# Patient Record
Sex: FEMALE | Race: BLACK OR AFRICAN AMERICAN | NOT HISPANIC OR LATINO | Employment: OTHER | URBAN - METROPOLITAN AREA
[De-identification: names, ages, dates, MRNs, and addresses within clinical notes are randomized per-mention and may not be internally consistent; named-entity substitution may affect disease eponyms.]

---

## 2019-11-21 ENCOUNTER — APPOINTMENT (OUTPATIENT)
Dept: RADIOLOGY | Facility: MEDICAL CENTER | Age: 64
DRG: 093 | End: 2019-11-21
Attending: EMERGENCY MEDICINE
Payer: MEDICAID

## 2019-11-21 ENCOUNTER — HOSPITAL ENCOUNTER (INPATIENT)
Facility: MEDICAL CENTER | Age: 64
LOS: 1 days | DRG: 093 | End: 2019-11-22
Attending: EMERGENCY MEDICINE | Admitting: HOSPITALIST
Payer: MEDICAID

## 2019-11-21 DIAGNOSIS — R42 LIGHTHEADEDNESS: ICD-10-CM

## 2019-11-21 DIAGNOSIS — R27.0 ATAXIA: ICD-10-CM

## 2019-11-21 DIAGNOSIS — R29.898 LEFT LEG WEAKNESS: ICD-10-CM

## 2019-11-21 DIAGNOSIS — S93.402A SPRAIN OF LEFT ANKLE, UNSPECIFIED LIGAMENT, INITIAL ENCOUNTER: ICD-10-CM

## 2019-11-21 PROBLEM — I10 HTN (HYPERTENSION): Status: ACTIVE | Noted: 2019-11-21

## 2019-11-21 PROBLEM — Z72.0 TOBACCO ABUSE: Status: ACTIVE | Noted: 2019-11-21

## 2019-11-21 LAB
ABO + RH BLD: NORMAL
ABO GROUP BLD: NORMAL
ALBUMIN SERPL BCP-MCNC: 3.2 G/DL (ref 3.2–4.9)
ALBUMIN/GLOB SERPL: 0.8 G/DL
ALP SERPL-CCNC: 138 U/L (ref 30–99)
ALT SERPL-CCNC: 87 U/L (ref 2–50)
ANION GAP SERPL CALC-SCNC: 10 MMOL/L (ref 0–11.9)
APPEARANCE UR: CLEAR
APTT PPP: 27.8 SEC (ref 24.7–36)
AST SERPL-CCNC: 94 U/L (ref 12–45)
BASOPHILS # BLD AUTO: 0.7 % (ref 0–1.8)
BASOPHILS # BLD: 0.05 K/UL (ref 0–0.12)
BILIRUB SERPL-MCNC: 0.3 MG/DL (ref 0.1–1.5)
BILIRUB UR QL STRIP.AUTO: NEGATIVE
BLD GP AB SCN SERPL QL: NORMAL
BUN SERPL-MCNC: 14 MG/DL (ref 8–22)
CALCIUM SERPL-MCNC: 8.4 MG/DL (ref 8.5–10.5)
CHLORIDE SERPL-SCNC: 105 MMOL/L (ref 96–112)
CO2 SERPL-SCNC: 20 MMOL/L (ref 20–33)
COLOR UR: YELLOW
COMMENT 1642: NORMAL
CREAT SERPL-MCNC: 0.52 MG/DL (ref 0.5–1.4)
EKG IMPRESSION: NORMAL
EOSINOPHIL # BLD AUTO: 0.15 K/UL (ref 0–0.51)
EOSINOPHIL NFR BLD: 2.1 % (ref 0–6.9)
ERYTHROCYTE [DISTWIDTH] IN BLOOD BY AUTOMATED COUNT: 46.3 FL (ref 35.9–50)
ERYTHROCYTE [DISTWIDTH] IN BLOOD BY AUTOMATED COUNT: 47 FL (ref 35.9–50)
GLOBULIN SER CALC-MCNC: 4 G/DL (ref 1.9–3.5)
GLUCOSE SERPL-MCNC: 142 MG/DL (ref 65–99)
GLUCOSE UR STRIP.AUTO-MCNC: NEGATIVE MG/DL
HCT VFR BLD AUTO: 46.1 % (ref 37–47)
HCT VFR BLD AUTO: 47.3 % (ref 37–47)
HGB BLD-MCNC: 14.3 G/DL (ref 12–16)
HGB BLD-MCNC: 15 G/DL (ref 12–16)
IMM GRANULOCYTES # BLD AUTO: 0.02 K/UL (ref 0–0.11)
IMM GRANULOCYTES NFR BLD AUTO: 0.3 % (ref 0–0.9)
INR PPP: 0.92 (ref 0.87–1.13)
KETONES UR STRIP.AUTO-MCNC: NEGATIVE MG/DL
LEUKOCYTE ESTERASE UR QL STRIP.AUTO: NEGATIVE
LYMPHOCYTES # BLD AUTO: 2.04 K/UL (ref 1–4.8)
LYMPHOCYTES NFR BLD: 27.9 % (ref 22–41)
MCH RBC QN AUTO: 28.4 PG (ref 27–33)
MCH RBC QN AUTO: 28.7 PG (ref 27–33)
MCHC RBC AUTO-ENTMCNC: 31 G/DL (ref 33.6–35)
MCHC RBC AUTO-ENTMCNC: 31.7 G/DL (ref 33.6–35)
MCV RBC AUTO: 90.4 FL (ref 81.4–97.8)
MCV RBC AUTO: 91.7 FL (ref 81.4–97.8)
MICRO URNS: NORMAL
MONOCYTES # BLD AUTO: 0.91 K/UL (ref 0–0.85)
MONOCYTES NFR BLD AUTO: 12.4 % (ref 0–13.4)
MORPHOLOGY BLD-IMP: NORMAL
NEUTROPHILS # BLD AUTO: 4.14 K/UL (ref 2–7.15)
NEUTROPHILS NFR BLD: 56.6 % (ref 44–72)
NITRITE UR QL STRIP.AUTO: NEGATIVE
NRBC # BLD AUTO: 0 K/UL
NRBC BLD-RTO: 0 /100 WBC
PH UR STRIP.AUTO: 6 [PH] (ref 5–8)
PLATELET # BLD AUTO: 210 K/UL (ref 164–446)
PLATELET # BLD AUTO: 233 K/UL (ref 164–446)
PLATELET BLD QL SMEAR: NORMAL
PMV BLD AUTO: 10.5 FL (ref 9–12.9)
PMV BLD AUTO: 10.9 FL (ref 9–12.9)
POTASSIUM SERPL-SCNC: 4 MMOL/L (ref 3.6–5.5)
PROT SERPL-MCNC: 7.2 G/DL (ref 6–8.2)
PROT UR QL STRIP: NEGATIVE MG/DL
PROTHROMBIN TIME: 12.5 SEC (ref 12–14.6)
RBC # BLD AUTO: 5.03 M/UL (ref 4.2–5.4)
RBC # BLD AUTO: 5.23 M/UL (ref 4.2–5.4)
RBC BLD AUTO: NORMAL
RBC UR QL AUTO: NEGATIVE
RH BLD: NORMAL
SODIUM SERPL-SCNC: 135 MMOL/L (ref 135–145)
SP GR UR STRIP.AUTO: 1.02
TROPONIN T SERPL-MCNC: <6 NG/L (ref 6–19)
UROBILINOGEN UR STRIP.AUTO-MCNC: 1 MG/DL
WBC # BLD AUTO: 7.3 K/UL (ref 4.8–10.8)
WBC # BLD AUTO: 8.7 K/UL (ref 4.8–10.8)

## 2019-11-21 PROCEDURE — 84484 ASSAY OF TROPONIN QUANT: CPT

## 2019-11-21 PROCEDURE — A9270 NON-COVERED ITEM OR SERVICE: HCPCS | Performed by: HOSPITALIST

## 2019-11-21 PROCEDURE — 700102 HCHG RX REV CODE 250 W/ 637 OVERRIDE(OP): Performed by: HOSPITALIST

## 2019-11-21 PROCEDURE — 85610 PROTHROMBIN TIME: CPT

## 2019-11-21 PROCEDURE — 99223 1ST HOSP IP/OBS HIGH 75: CPT | Mod: AI,25 | Performed by: HOSPITALIST

## 2019-11-21 PROCEDURE — 70450 CT HEAD/BRAIN W/O DYE: CPT

## 2019-11-21 PROCEDURE — 700117 HCHG RX CONTRAST REV CODE 255: Performed by: EMERGENCY MEDICINE

## 2019-11-21 PROCEDURE — 93005 ELECTROCARDIOGRAM TRACING: CPT | Performed by: EMERGENCY MEDICINE

## 2019-11-21 PROCEDURE — 86900 BLOOD TYPING SEROLOGIC ABO: CPT

## 2019-11-21 PROCEDURE — 73610 X-RAY EXAM OF ANKLE: CPT | Mod: LT

## 2019-11-21 PROCEDURE — 99285 EMERGENCY DEPT VISIT HI MDM: CPT

## 2019-11-21 PROCEDURE — 73590 X-RAY EXAM OF LOWER LEG: CPT | Mod: LT

## 2019-11-21 PROCEDURE — 0042T CT-CEREBRAL PERFUSION ANALYSIS: CPT

## 2019-11-21 PROCEDURE — 85025 COMPLETE CBC W/AUTO DIFF WBC: CPT

## 2019-11-21 PROCEDURE — 71045 X-RAY EXAM CHEST 1 VIEW: CPT

## 2019-11-21 PROCEDURE — 86850 RBC ANTIBODY SCREEN: CPT

## 2019-11-21 PROCEDURE — 99406 BEHAV CHNG SMOKING 3-10 MIN: CPT | Performed by: HOSPITALIST

## 2019-11-21 PROCEDURE — 80053 COMPREHEN METABOLIC PANEL: CPT

## 2019-11-21 PROCEDURE — 85730 THROMBOPLASTIN TIME PARTIAL: CPT

## 2019-11-21 PROCEDURE — 93005 ELECTROCARDIOGRAM TRACING: CPT

## 2019-11-21 PROCEDURE — 770006 HCHG ROOM/CARE - MED/SURG/GYN SEMI*

## 2019-11-21 PROCEDURE — 86901 BLOOD TYPING SEROLOGIC RH(D): CPT

## 2019-11-21 PROCEDURE — 85027 COMPLETE CBC AUTOMATED: CPT

## 2019-11-21 PROCEDURE — 81003 URINALYSIS AUTO W/O SCOPE: CPT

## 2019-11-21 RX ORDER — ASPIRIN 300 MG/1
300 SUPPOSITORY RECTAL DAILY
Status: DISCONTINUED | OUTPATIENT
Start: 2019-11-21 | End: 2019-11-22 | Stop reason: HOSPADM

## 2019-11-21 RX ORDER — ASPIRIN 325 MG
325 TABLET ORAL DAILY
Status: DISCONTINUED | OUTPATIENT
Start: 2019-11-21 | End: 2019-11-22 | Stop reason: HOSPADM

## 2019-11-21 RX ORDER — ASPIRIN 325 MG
325 TABLET ORAL ONCE
Status: DISCONTINUED | OUTPATIENT
Start: 2019-11-21 | End: 2019-11-22 | Stop reason: HOSPADM

## 2019-11-21 RX ORDER — ASPIRIN 81 MG/1
324 TABLET, CHEWABLE ORAL DAILY
Status: DISCONTINUED | OUTPATIENT
Start: 2019-11-21 | End: 2019-11-22 | Stop reason: HOSPADM

## 2019-11-21 RX ORDER — LORATADINE 10 MG/1
10 CAPSULE, LIQUID FILLED ORAL
COMMUNITY
End: 2020-06-08

## 2019-11-21 RX ORDER — POLYETHYLENE GLYCOL 3350 17 G/17G
1 POWDER, FOR SOLUTION ORAL
Status: DISCONTINUED | OUTPATIENT
Start: 2019-11-21 | End: 2019-11-22 | Stop reason: HOSPADM

## 2019-11-21 RX ORDER — ATORVASTATIN CALCIUM 80 MG/1
80 TABLET, FILM COATED ORAL EVERY EVENING
Status: DISCONTINUED | OUTPATIENT
Start: 2019-11-21 | End: 2019-11-22 | Stop reason: HOSPADM

## 2019-11-21 RX ORDER — CHLORAL HYDRATE 500 MG
1000 CAPSULE ORAL
COMMUNITY
End: 2020-06-08

## 2019-11-21 RX ORDER — BISACODYL 10 MG
10 SUPPOSITORY, RECTAL RECTAL
Status: DISCONTINUED | OUTPATIENT
Start: 2019-11-21 | End: 2019-11-22 | Stop reason: HOSPADM

## 2019-11-21 RX ORDER — AMLODIPINE BESYLATE 5 MG/1
10 TABLET ORAL DAILY
Status: DISCONTINUED | OUTPATIENT
Start: 2019-11-22 | End: 2019-11-22 | Stop reason: HOSPADM

## 2019-11-21 RX ORDER — AMOXICILLIN 250 MG
2 CAPSULE ORAL 2 TIMES DAILY
Status: DISCONTINUED | OUTPATIENT
Start: 2019-11-22 | End: 2019-11-22 | Stop reason: HOSPADM

## 2019-11-21 RX ORDER — NICOTINE 21 MG/24HR
14 PATCH, TRANSDERMAL 24 HOURS TRANSDERMAL
Status: DISCONTINUED | OUTPATIENT
Start: 2019-11-22 | End: 2019-11-22 | Stop reason: HOSPADM

## 2019-11-21 RX ORDER — AMLODIPINE BESYLATE 10 MG/1
10 TABLET ORAL DAILY
COMMUNITY
End: 2020-06-08

## 2019-11-21 RX ADMIN — IOHEXOL 40 ML: 350 INJECTION, SOLUTION INTRAVENOUS at 18:18

## 2019-11-21 RX ADMIN — ASPIRIN 325 MG: 325 TABLET, FILM COATED ORAL at 19:56

## 2019-11-21 RX ADMIN — ATORVASTATIN CALCIUM 80 MG: 80 TABLET, FILM COATED ORAL at 23:11

## 2019-11-21 ASSESSMENT — COPD QUESTIONNAIRES
DO YOU EVER COUGH UP ANY MUCUS OR PHLEGM?: NO/ONLY WITH OCCASIONAL COLDS OR INFECTIONS
IN THE PAST 12 MONTHS DO YOU DO LESS THAN YOU USED TO BECAUSE OF YOUR BREATHING PROBLEMS: DISAGREE/UNSURE
HAVE YOU SMOKED AT LEAST 100 CIGARETTES IN YOUR ENTIRE LIFE: YES
DURING THE PAST 4 WEEKS HOW MUCH DID YOU FEEL SHORT OF BREATH: NONE/LITTLE OF THE TIME
COPD SCREENING SCORE: 4

## 2019-11-21 ASSESSMENT — ENCOUNTER SYMPTOMS
HEADACHES: 0
VOMITING: 0
CHILLS: 0
WHEEZING: 0
PHOTOPHOBIA: 0
SHORTNESS OF BREATH: 0
DIARRHEA: 0
COUGH: 0
PALPITATIONS: 0
DEPRESSION: 0
FOCAL WEAKNESS: 0
MYALGIAS: 0
SENSORY CHANGE: 1
NAUSEA: 0
FEVER: 0
SORE THROAT: 0
TINGLING: 0
DIZZINESS: 1
ABDOMINAL PAIN: 0

## 2019-11-21 ASSESSMENT — LIFESTYLE VARIABLES
AVERAGE NUMBER OF DAYS PER WEEK YOU HAVE A DRINK CONTAINING ALCOHOL: 0
HAVE YOU EVER FELT YOU SHOULD CUT DOWN ON YOUR DRINKING: NO
EVER_SMOKED: YES
TOTAL SCORE: 0
ON A TYPICAL DAY WHEN YOU DRINK ALCOHOL HOW MANY DRINKS DO YOU HAVE: 0
EVER FELT BAD OR GUILTY ABOUT YOUR DRINKING: NO
EVER HAD A DRINK FIRST THING IN THE MORNING TO STEADY YOUR NERVES TO GET RID OF A HANGOVER: NO
HOW MANY TIMES IN THE PAST YEAR HAVE YOU HAD 5 OR MORE DRINKS IN A DAY: 0
ALCOHOL_USE: NO
CONSUMPTION TOTAL: NEGATIVE
HAVE PEOPLE ANNOYED YOU BY CRITICIZING YOUR DRINKING: NO
TOTAL SCORE: 0
TOTAL SCORE: 0
EVER_SMOKED: YES

## 2019-11-21 ASSESSMENT — PATIENT HEALTH QUESTIONNAIRE - PHQ9
2. FEELING DOWN, DEPRESSED, IRRITABLE, OR HOPELESS: NOT AT ALL
SUM OF ALL RESPONSES TO PHQ9 QUESTIONS 1 AND 2: 0
1. LITTLE INTEREST OR PLEASURE IN DOING THINGS: NOT AT ALL

## 2019-11-21 ASSESSMENT — PAIN DESCRIPTION - DESCRIPTORS: DESCRIPTORS: SORE

## 2019-11-21 NOTE — ED TRIAGE NOTES
Pt taken for EKG, then returned to lobby, educated on triage process, and to inform staff of any changes or concerns.

## 2019-11-21 NOTE — ED TRIAGE NOTES
"Lizzy Walton  Chief Complaint   Patient presents with   • Leg Pain     left lower, started today   • Dizziness     intermittent over last few days   • Sent from Urgent Care     Pt ambulatory to triage with above complaint.  VSS, no acute distress. Pt concerned because hx of stroke,  Previous sx included \"swimming in the head\".    Pt also states that she fell 2 days ago on left leg, but did not have pain until today.   Pt returned to lobby, educated on triage process, and to inform staff of any changes or concerns.     "

## 2019-11-21 NOTE — ED NOTES
Patient is resting comfortably.watching television at this time, call light device in hand and patient has received instruction on use and verbalized understanding

## 2019-11-22 ENCOUNTER — APPOINTMENT (OUTPATIENT)
Dept: RADIOLOGY | Facility: MEDICAL CENTER | Age: 64
DRG: 093 | End: 2019-11-22
Attending: HOSPITALIST
Payer: MEDICAID

## 2019-11-22 ENCOUNTER — APPOINTMENT (OUTPATIENT)
Dept: CARDIOLOGY | Facility: MEDICAL CENTER | Age: 64
DRG: 093 | End: 2019-11-22
Attending: HOSPITALIST
Payer: MEDICAID

## 2019-11-22 VITALS
HEIGHT: 63 IN | OXYGEN SATURATION: 96 % | BODY MASS INDEX: 32.73 KG/M2 | SYSTOLIC BLOOD PRESSURE: 132 MMHG | HEART RATE: 90 BPM | RESPIRATION RATE: 16 BRPM | TEMPERATURE: 99 F | WEIGHT: 184.75 LBS | DIASTOLIC BLOOD PRESSURE: 64 MMHG

## 2019-11-22 PROBLEM — J40 BRONCHITIS: Status: ACTIVE | Noted: 2019-11-22

## 2019-11-22 LAB
ANION GAP SERPL CALC-SCNC: 7 MMOL/L (ref 0–11.9)
BUN SERPL-MCNC: 14 MG/DL (ref 8–22)
CALCIUM SERPL-MCNC: 8.8 MG/DL (ref 8.5–10.5)
CHLORIDE SERPL-SCNC: 106 MMOL/L (ref 96–112)
CHOLEST SERPL-MCNC: 147 MG/DL (ref 100–199)
CO2 SERPL-SCNC: 25 MMOL/L (ref 20–33)
CREAT SERPL-MCNC: 0.72 MG/DL (ref 0.5–1.4)
ERYTHROCYTE [DISTWIDTH] IN BLOOD BY AUTOMATED COUNT: 45.1 FL (ref 35.9–50)
EST. AVERAGE GLUCOSE BLD GHB EST-MCNC: 134 MG/DL
GLUCOSE SERPL-MCNC: 114 MG/DL (ref 65–99)
HBA1C MFR BLD: 6.3 % (ref 0–5.6)
HCT VFR BLD AUTO: 46.8 % (ref 37–47)
HDLC SERPL-MCNC: 35 MG/DL
HGB BLD-MCNC: 15 G/DL (ref 12–16)
LDLC SERPL CALC-MCNC: 92 MG/DL
MCH RBC QN AUTO: 28.6 PG (ref 27–33)
MCHC RBC AUTO-ENTMCNC: 32.1 G/DL (ref 33.6–35)
MCV RBC AUTO: 89.3 FL (ref 81.4–97.8)
PLATELET # BLD AUTO: 222 K/UL (ref 164–446)
PMV BLD AUTO: 10.7 FL (ref 9–12.9)
POTASSIUM SERPL-SCNC: 4.1 MMOL/L (ref 3.6–5.5)
RBC # BLD AUTO: 5.24 M/UL (ref 4.2–5.4)
SODIUM SERPL-SCNC: 138 MMOL/L (ref 135–145)
TRIGL SERPL-MCNC: 99 MG/DL (ref 0–149)
WBC # BLD AUTO: 6.4 K/UL (ref 4.8–10.8)

## 2019-11-22 PROCEDURE — 80048 BASIC METABOLIC PNL TOTAL CA: CPT

## 2019-11-22 PROCEDURE — 80061 LIPID PANEL: CPT

## 2019-11-22 PROCEDURE — A9270 NON-COVERED ITEM OR SERVICE: HCPCS | Performed by: HOSPITALIST

## 2019-11-22 PROCEDURE — 93880 EXTRACRANIAL BILAT STUDY: CPT

## 2019-11-22 PROCEDURE — 85027 COMPLETE CBC AUTOMATED: CPT

## 2019-11-22 PROCEDURE — 83036 HEMOGLOBIN GLYCOSYLATED A1C: CPT

## 2019-11-22 PROCEDURE — 97165 OT EVAL LOW COMPLEX 30 MIN: CPT

## 2019-11-22 PROCEDURE — 90686 IIV4 VACC NO PRSV 0.5 ML IM: CPT | Performed by: HOSPITALIST

## 2019-11-22 PROCEDURE — 3E02340 INTRODUCTION OF INFLUENZA VACCINE INTO MUSCLE, PERCUTANEOUS APPROACH: ICD-10-PCS | Performed by: HOSPITALIST

## 2019-11-22 PROCEDURE — 97161 PT EVAL LOW COMPLEX 20 MIN: CPT

## 2019-11-22 PROCEDURE — 700102 HCHG RX REV CODE 250 W/ 637 OVERRIDE(OP): Performed by: HOSPITALIST

## 2019-11-22 PROCEDURE — 70551 MRI BRAIN STEM W/O DYE: CPT

## 2019-11-22 PROCEDURE — 90471 IMMUNIZATION ADMIN: CPT

## 2019-11-22 PROCEDURE — 700111 HCHG RX REV CODE 636 W/ 250 OVERRIDE (IP): Performed by: HOSPITALIST

## 2019-11-22 PROCEDURE — 36415 COLL VENOUS BLD VENIPUNCTURE: CPT

## 2019-11-22 RX ORDER — AZITHROMYCIN 250 MG/1
TABLET, FILM COATED ORAL
Qty: 6 TAB | Refills: 0 | Status: SHIPPED
Start: 2019-11-22 | End: 2020-06-08

## 2019-11-22 RX ADMIN — INFLUENZA A VIRUS A/BRISBANE/02/2018 IVR-190 (H1N1) ANTIGEN (FORMALDEHYDE INACTIVATED), INFLUENZA A VIRUS A/KANSAS/14/2017 X-327 (H3N2) ANTIGEN (FORMALDEHYDE INACTIVATED), INFLUENZA B VIRUS B/PHUKET/3073/2013 ANTIGEN (FORMALDEHYDE INACTIVATED), AND INFLUENZA B VIRUS B/MARYLAND/15/2016 BX-69A ANTIGEN (FORMALDEHYDE INACTIVATED) 0.5 ML: 15; 15; 15; 15 INJECTION, SUSPENSION INTRAMUSCULAR at 14:22

## 2019-11-22 RX ADMIN — AMLODIPINE BESYLATE 10 MG: 5 TABLET ORAL at 04:23

## 2019-11-22 RX ADMIN — NICOTINE 14 MG: 14 PATCH TRANSDERMAL at 04:23

## 2019-11-22 ASSESSMENT — COGNITIVE AND FUNCTIONAL STATUS - GENERAL
SUGGESTED CMS G CODE MODIFIER DAILY ACTIVITY: CI
CLIMB 3 TO 5 STEPS WITH RAILING: A LOT
DAILY ACTIVITIY SCORE: 23
TURNING FROM BACK TO SIDE WHILE IN FLAT BAD: A LITTLE
SUGGESTED CMS G CODE MODIFIER MOBILITY: CK
STANDING UP FROM CHAIR USING ARMS: A LITTLE
WALKING IN HOSPITAL ROOM: A LITTLE
MOBILITY SCORE: 17
MOVING FROM LYING ON BACK TO SITTING ON SIDE OF FLAT BED: A LITTLE
MOVING TO AND FROM BED TO CHAIR: A LITTLE
HELP NEEDED FOR BATHING: A LITTLE

## 2019-11-22 ASSESSMENT — GAIT ASSESSMENTS
GAIT LEVEL OF ASSIST: SUPERVISED
DISTANCE (FEET): 250

## 2019-11-22 ASSESSMENT — ACTIVITIES OF DAILY LIVING (ADL): TOILETING: INDEPENDENT

## 2019-11-22 NOTE — ED NOTES
Called CDU regarding ETA of transport, confirmed patient has an order for cardiac monitoring. Per CDU, RN will be down soon to  patient

## 2019-11-22 NOTE — THERAPY
"Occupational Therapy Evaluation completed.   Plan of Care: Patient with no further skilled OT needs in the acute care setting at this time  Discharge Recommendations:  Equipment: defer boot and mobility AE to PT. Post-acute therapy OT evaluation completed. Patient is currently not being actively followed for occupational therapy services at this time. However, pt may be seen at the request of attending provider for an additional visit to address any discharge or equipment needs within 30 days from evaluation.    Patient at / near baseline. Patient and nurse report doctor considering boot based on PT eval. Defer boot and mobility AE to PT.  DC OT.    See \"Rehab Therapy-Acute\" Patient Summary Report for complete documentation.    "

## 2019-11-22 NOTE — DISCHARGE PLANNING
Patient is eligible for Medicaid Meds to Beds at discharge if they have coverage with [place] Medicaid, Medicaid FFS, Medicaid HMO ([facility]), or [employer]. This service is provided through the [facility] if orders are received by the pharmacy prior to 4pm Monday through Friday excluding holidays. Preferred pharmacy has been changed to [facility]. Please call [phone] prior to discharge.

## 2019-11-22 NOTE — DISCHARGE PLANNING
Medication reconcilliation completed. Medications delivered to patient at bedside. Patient counseled.    Interventions include: insurance doesn't cover ASA 81mg tabs. Patient aware to  at outpatient pharmacy and take 1 tablet daily.     Lizzy Walton   Home Medication Instructions MIRA:[id number]    Printed on:11/22/19 0558   Medication Information                      azithromycin (ZITHROMAX) 250 MG Tab  2 tabs PO on day ONE and then one tab po daily for next 5 days

## 2019-11-22 NOTE — ASSESSMENT & PLAN NOTE
This patient continues to smoke cigarettes. 4 minutes were spent counseling the patient in cessation techniques. Patient understands smoking increases risk factors for stroke and death. Patient is open to counseling.  The benefits of stopping were presented and nicotine replacement has been ordered to assist with withdrawal from nicotine during hospitalization and we will continue to encourage cessation and discuss other supportive resources including community groups and prescription medications.

## 2019-11-22 NOTE — ED NOTES
Report received, assumed care of patient. Patient resting in gurney, eating boxed meal. No needs at this time, will continue to monitor.

## 2019-11-22 NOTE — CARE PLAN
Problem: Safety  Goal: Will remain free from falls  Outcome: PROGRESSING AS EXPECTED  Intervention: Implement fall precautions  Flowsheets  Taken 11/22/2019 0900  Bed Alarm: Yes - Alarm On  Taken 11/22/2019 0800  Environmental Precautions: Treaded Slipper Socks on Patient;Personal Belongings, Wastebasket, Call Bell etc. in Easy Reach;Transferred to Stronger Side;Report Given to Other Health Care Providers Regarding Fall Risk;Bed in Low Position;Communication Sign for Patients & Families;Mobility Assessed & Appropriate Sign Placed     Problem: Infection  Goal: Will remain free from infection  Outcome: PROGRESSING AS EXPECTED  Intervention: Implement standard precautions and perform hand washing before and after patient contact  Note:   Standard precautions implemented.

## 2019-11-22 NOTE — PROGRESS NOTES
Transfered pt. from red pod. Patient alert oriented x4, self. Room air. Assessment complete. Answered all questions and concern regarding care. Safety precaution & hourly rounding in place.

## 2019-11-22 NOTE — THERAPY
"Physical Therapy Evaluation completed.   Bed Mobility:  Supine to Sit: Supervised  Transfers: Sit to Stand: Supervised  Gait: Level Of Assist: Supervised with No Equipment Needed x250ft      Plan of Care: Patient with no further skilled PT needs in the acute care setting at this time  Discharge Recommendations: Equipment: No Equipment Needed. Post-acute therapy Currently anticipate no further skilled therapy needs once patient is discharged from the inpatient setting.    Pt is 63 y/o F admitted 2/2 LLE pain and dizziness. Pt performing all mobility at SPV level. BLE strength equal 4/5. She reports intermittent weakness on LLE and pain with increased mobility. PT educated pt to elevate L ankle when it gets swollen and if she feels that it is unstable she can use an ACE wrap to provide more support. Pt states that she has done this in the past. Pt tolerating amb x250ft with SPV no AD. Pt demonstrates wise based gait using UE and trunk rotation for forward momentum with very fast tan. Utilizing SPC would increase fall risk with these gait mechanics and pt verbalizes understanding. Anticipate DC home with no further PT needs.     See \"Rehab Therapy-Acute\" Patient Summary Report for complete documentation.     "

## 2019-11-22 NOTE — ED NOTES
Attempted pressure flush on r ac piv. Pt unable to tolerated. Ct delayed until better piv is able to be placed.

## 2019-11-22 NOTE — H&P
Hospital Medicine History & Physical Note    Date of Service  11/21/2019    Primary Care Physician  Pcp Pt States None    Consultants  Dr. Weeks, neurology    Code Status  Full    Chief Complaint  Chief Complaint   Patient presents with   • Leg Pain     left lower, started today   • Dizziness     intermittent over last few days   • Sent from Urgent Care       History of Presenting Illness  64 y.o. female who presented on 11/21/2019 after being seen at the urgent care with complaints of dizziness and left leg weakness.  This is a older female with a history of hypertension and reported previous CVA 2 years ago who comes in with complaints as noted above.  The patient states that her symptoms began 3 days ago when she noted that her left leg was weaker than usual as she was walking and found that she was dragging her foot.  Her gait has been unstable because of this which led to a fall and she now also complains of a left ankle pain.  The patient states that she did not seek immediate medical assistance at that time and that the symptoms are different from her prior CVA.  During her prior CVA, she reports mental confusion and difficulty with words none of which have occurred.  She denies any residual deficits from her previous CVA.  Otherwise, the patient does also reported left lower extremity pain which is aggravated when she attempts to ambulate and has no alleviating factors.  She has not had any fevers, chills, headache, chest pain, shortness of breath, abdominal pain, diarrhea or dysuria.    Review of Systems  Review of Systems   Constitutional: Negative for chills and fever.   HENT: Negative for congestion and sore throat.    Eyes: Negative for photophobia.   Respiratory: Negative for cough, shortness of breath and wheezing.    Cardiovascular: Negative for chest pain and palpitations.   Gastrointestinal: Negative for abdominal pain, diarrhea, nausea and vomiting.   Genitourinary: Negative for dysuria.    Musculoskeletal: Negative for myalgias.   Skin: Negative.    Neurological: Positive for dizziness and sensory change. Negative for tingling, focal weakness and headaches.        Unstable walking, left leg weak   Psychiatric/Behavioral: Negative for depression and suicidal ideas.       Past Medical History  Past Medical History:   Diagnosis Date   • Hypertension    • Stroke (HCC)        Surgical History  Patient denies    Family History  History reviewed. No pertinent family history.    Social History  Social History     Tobacco Use   • Smoking status: Current Every Day Smoker     Packs/day: 0.00     Types: Cigarettes   • Smokeless tobacco: Never Used   Substance Use Topics   • Alcohol use: Not Currently   • Drug use: Not Currently       Allergies  No Known Allergies    Medications  No current facility-administered medications on file prior to encounter.      Current Outpatient Medications on File Prior to Encounter   Medication Sig Dispense Refill   • amLODIPine (NORVASC) 10 MG Tab Take 10 mg by mouth every day.     • Loratadine (CLARITIN) 10 MG Cap Take 10 mg by mouth 1 time daily as needed.     • Omega-3 Fatty Acids (FISH OIL) 1000 MG Cap capsule Take 1,000 mg by mouth 1 time daily as needed.         Physical Exam  Hemodynamics  Temp (24hrs), Av.4 °C (97.5 °F), Min:36.4 °C (97.5 °F), Max:36.4 °C (97.5 °F)   Temperature: 36.4 °C (97.5 °F)  Pulse  Av.6  Min: 82  Max: 90    Blood Pressure: 156/82      Respiratory      Respiration: 14, Pulse Oximetry: 94 %             Physical Exam   Constitutional: She is oriented to person, place, and time. No distress.   HENT:   Head: Normocephalic and atraumatic.   Right Ear: External ear normal.   Left Ear: External ear normal.   Eyes: EOM are normal. Right eye exhibits no discharge. Left eye exhibits no discharge.   Neck: Neck supple. No JVD present.   Cardiovascular: Normal rate, regular rhythm and normal heart sounds.   Pulmonary/Chest: Effort normal and breath  sounds normal. No respiratory distress. She exhibits no tenderness.   Abdominal: Soft. Bowel sounds are normal. She exhibits no distension. There is no tenderness.   Musculoskeletal: Normal range of motion.         General: No edema.   Neurological: She is alert and oriented to person, place, and time. No cranial nerve deficit. Coordination abnormal.   Skin: Skin is warm and dry. She is not diaphoretic. No erythema.   Psychiatric: She has a normal mood and affect. Her behavior is normal.   Nursing note and vitals reviewed.    Capillary refill less than 3 seconds, distal pulses intact    Laboratory:  Recent Labs     11/21/19  1600 11/21/19  1730   WBC 7.3 8.7   RBC 5.23 5.03   HEMOGLOBIN 15.0 14.3   HEMATOCRIT 47.3* 46.1   MCV 90.4 91.7   MCH 28.7 28.4   MCHC 31.7* 31.0*   RDW 46.3 47.0   PLATELETCT 210 233   MPV 10.9 10.5     Recent Labs     11/21/19  1600   SODIUM 135   POTASSIUM 4.0   CHLORIDE 105   CO2 20   GLUCOSE 142*   BUN 14   CREATININE 0.52   CALCIUM 8.4*     Recent Labs     11/21/19  1600   ALTSGPT 87*   ASTSGOT 94*   ALKPHOSPHAT 138*   TBILIRUBIN 0.3   GLUCOSE 142*     Recent Labs     11/21/19  1600   APTT 27.8   INR 0.92             No results found for: TROPONINI    Imaging  Ct-head W/o    Result Date: 11/21/2019 11/21/2019 5:55 PM HISTORY/REASON FOR EXAM:  Dizziness TECHNIQUE/EXAM DESCRIPTION AND NUMBER OF VIEWS:    CT of the head without contrast. Contiguous 5 mm axial sections were obtained from the skull base through the vertex. Up to date radiation dose reduction adjustments have been utilized to meet ALARA standards for radiation dose reduction. COMPARISON:   None. FINDINGS: No acute intracranial hemorrhage is seen. There is no midline shift. Ventricles are normal in size and configuration. Gray white junction differentiation is distinct. White matter hypodensities are considerable and there is some encephalomalacia adjacent to the right frontal horn. There is a chronic lacunar infarction of the  right caudate head. Basal ganglia calcifications are likely age-related Visualized paranasal sinuses and mastoid air cells are clear. Leftward nasal septal deviation. Ethmoid osteomas. No acute calvarium abnormality is noted.     No acute intracranial hemorrhage is identified. Extensive white matter hypodensity is present.  This is a nonspecific finding which usually is found to represent chronic microvascular disease in patient's of this demographic.  Demyelination, age indeterminant ischemia and gliosis are also common possibilities. Chronic right frontal and caudate head encephalomalacia/lacunar infarctions    Dx-chest-portable (1 View)    Result Date: 11/21/2019 11/21/2019 3:21 PM HISTORY/REASON FOR EXAM:  Stroke Protocol; Stroke Protocol. TECHNIQUE/EXAM DESCRIPTION AND NUMBER OF VIEWS: Single portable view of the chest. COMPARISON: None FINDINGS: The soft tissues and bony structures are unremarkable. The heart and mediastinal structures are within normal limits. Pulmonary vascularity is normal. The lung fields are clear. There is no effusion or pneumothorax.     No acute cardiopulmonary disease evident.    Ct-cerebral Perfusion Analysis    Result Date: 11/21/2019 11/21/2019 5:55 PM HISTORY/REASON FOR EXAM:  Neuro deficit, acute, persistent or progressing; Stroke protocol. TECHNIQUE/EXAM DESCRIPTION AND NUMBER OF VIEWS: CT Cerebral Perfusion Analysis. The study was performed on a 128 slice [facility] Multidetector CT scanner. Perfusion data and corresponding time-activity curves are processed and displayed as color-coded maps in the axial plane for Cerebral Blood Flow (CBF), Cerebral Blood Volume  (CBV),T Max and Mean Transit Time (MTT) and are post processed on the Ischemia view-RAPID virtual . 40 mL of Omnipaque 350 nonionic contrast was injected intravenously. Low dose optimization technique was utilized for this CT exam including automated exposure control and adjustment of the mA and/or kV  according to patient size. COMPARISON:  None. FINDINGS: Cerebral blood flow less than 30% = 0 mL. T Max more than 6 seconds = 13 mL. Mismatch volume = 13 mL. Mismatch ratio = Infinite     1.  Cerebral blood flow less than 30% likely representing completed infarct = 0 mL. 2.  T Max more than 6 seconds, which may be artifactual or represent a combination of completed infarct and ischemia = 13 mL. 3.  Mismatched volume, which may be artifactual or represent ischemic brain/penumbra = 13 mL. 4.  Please note that the cerebral perfusion was performed on the limited brain tissue around the basal ganglia region. Infarct/ischemia outside the CT perfusion sections can be missed in this study.        Assessment/Plan:  Anticipate that patient will need greater than 2 midnights for management of the discussed medical issues.    * Ataxia  Assessment & Plan  Patient reports previous history of CVA, no residual deficits and symptoms not similar.  Prior she had issues with memory and speech.  Today she has had 3 days of left lower extremity weakness with ataxic gait.  CT scan of the head is negative and the patient is far outside of the window for TPA.  CT perfusion study does show a mismatch but this could be attributed to her previous CVA.  The patient will be admitted to the hospital and monitor closely with every 4 hour neurology checks.  I will start her on an aspirin and a statin and we will plan for an MRI, echocardiogram, and carotid ultrasound in the morning.  Dr. Weeks of neurology was consulted by the emergency room physician and I appreciate his recommendations.    HTN (hypertension)  Assessment & Plan  No need for permissive hypertension, continue home Norvasc.    Tobacco abuse  Assessment & Plan  This patient continues to smoke cigarettes. 4 minutes were spent counseling the patient in cessation techniques. Patient understands smoking increases risk factors for stroke and death. Patient is open to counseling.  The  benefits of stopping were presented and nicotine replacement has been ordered to assist with withdrawal from nicotine during hospitalization and we will continue to encourage cessation and discuss other supportive resources including community groups and prescription medications.      Prophylaxis: Sequential compression devices for DVT prophylaxis, no PPI indicated, bowel protocol as needed

## 2019-11-22 NOTE — PROGRESS NOTES
Spiritual Care Note    Patient Information     Patient's Name: Lizzy Walton   MRN: [id number]    YOB: 1955   Age and Gender: 64 y.o. female   Service Area: [facility]   Room (and Bed): [place] [address]   Ethnicity or Nationality:     Primary Language: English   Caodaism/Spiritual preference: Nondenominational   Place of Residence: [place], [place]   Family/Friends/Others Present: n/a   Clinical Team Present: n/a   Medical Diagnosis(-es)/Procedure(s): Leg pain   Code Status: Full Code    Date of Admission: 11/21/2019   Length of Stay: 1 days        Spiritual Care Provider Information:  Name of Spiritual Care Provider: Yoana Martines  Title of Spiritual Care Provider: Associate   Phone Number: [phone]  E-mail: [email or web address]  5 minutes    Spiritual Screen Results:    Gen Nursing  Spiritual Screen  Is your spiritual health or inner well-being important to you as you cope with your medical condition?: Yes  Would you like to receive a visit from our Spiritual Care team or your own Gnosticism or spiritual leader?: Yes  Was spiritual care education provided to the patient?: Yes     Palliative Care  PC Caodaism/Spiritual Screening  Was spiritual care education provided to the patient?: Yes      Encounter/Request Information  Encounter/Request Type   Referral From/ Origin of Request: [facility] nursing  Referral To: Community clergy    Religous Needs/Values       Spiritual Assessment     Spiritual Care Encounters    Interaction/Conversation: Referred pt to [facility] negron.    Notes:

## 2019-11-22 NOTE — PROGRESS NOTES
IV infiltrate warm compress applied patient tolerating treatment well.  Informed Dayton Begum RN via verbal.

## 2019-11-22 NOTE — PROGRESS NOTES
2 RN skin check completed with TOOTIE Cooper    -Blistering to left forearm due to previous IV infiltrate   -Bilateral heels dry and calloused   -Small scab noted to left knee

## 2019-11-22 NOTE — ED NOTES
Pt back from ct scan. Per report piv infiltrated and approx 80 cc of contrast infiltrated to arm.

## 2019-11-22 NOTE — ASSESSMENT & PLAN NOTE
Patient reports previous history of CVA, no residual deficits and symptoms not similar.  Prior she had issues with memory and speech.  Today she has had 3 days of left lower extremity weakness with ataxic gait.  CT scan of the head is negative and the patient is far outside of the window for TPA.  CT perfusion study does show a mismatch but this could be attributed to her previous CVA.  The patient will be admitted to the hospital and monitor closely with every 4 hour neurology checks.  I will start her on an aspirin and a statin and we will plan for an MRI, echocardiogram, and carotid ultrasound in the morning.  Dr. Weeks of neurology was consulted by the emergency room physician and I appreciate his recommendations.

## 2019-11-23 PROBLEM — Z86.73 HISTORY OF CVA IN ADULTHOOD: Status: ACTIVE | Noted: 2019-11-23

## 2019-11-23 PROCEDURE — 99239 HOSP IP/OBS DSCHRG MGMT >30: CPT | Performed by: HOSPITALIST

## 2019-11-23 NOTE — DISCHARGE INSTRUCTIONS
Discharge Instructions per Eliecer Hubbard M.D.    Do not smoke    DIET: cardiac    ACTIVITY: [id number]    DIAGNOSIS: unsteady gait                Discharge Instructions    Discharged to other by car with escort. Discharged via wheelchair, hospital escort: Refused.  Special equipment needed: Not Applicable    Be sure to schedule a follow-up appointment with your primary care doctor or any specialists as instructed.     Discharge Plan:   Diet Plan: Discussed  Activity Level: Discussed  Confirmed Follow up Appointment: Patient to Call and Schedule Appointment  Confirmed Symptoms Management: Discussed  Medication Reconciliation Updated: Yes  Influenza Vaccine Indication: Indicated: 9 to 64 years of age    I understand that a diet low in cholesterol, fat, and sodium is recommended for good health. Unless I have been given specific instructions below for another diet, I accept this instruction as my diet prescription.   Other diet: Regular    Special Instructions: None    · Is patient discharged on Warfarin / Coumadin?   No     Depression / Suicide Risk    As you are discharged from this [facility] facility, it is important to learn how to keep safe from harming yourself.    Recognize the warning signs:  · Abrupt changes in personality, positive or negative- including increase in energy   · Giving away possessions  · Change in eating patterns- significant weight changes-  positive or negative  · Change in sleeping patterns- unable to sleep or sleeping all the time   · Unwillingness or inability to communicate  · Depression  · Unusual sadness, discouragement and loneliness  · Talk of wanting to die  · Neglect of personal appearance   · Rebelliousness- reckless behavior  · Withdrawal from people/activities they love  · Confusion- inability to concentrate     If you or a loved one observes any of these behaviors or has concerns about self-harm, here's what you can do:  · Talk about it- your feelings and  reasons for harming yourself  · Remove any means that you might use to hurt yourself (examples: pills, rope, extension cords, firearm)  · Get professional help from the community (Mental Health, Substance Abuse, psychological counseling)  · Do not be alone:Call your Safe Contact- someone whom you trust who will be there for you.  · Call your local CRISIS HOTLINE 679-5460 or 260-587-9773  · Call your local Children's Mobile Crisis Response Team [place] (818) 697-6010 or wwwInterse  · Call the toll free National Suicide Prevention Hotlines   · National Suicide Prevention Lifeline 221-261-VZNY (0732)  · National Hope Line Network 800-SUICIDE (112-0556)  Azithromycin tablets  What is this medicine?  AZITHROMYCIN (az ith georgiana MYE sin) is a macrolide antibiotic. It is used to treat or prevent certain kinds of bacterial infections. It will not work for colds, flu, or other viral infections.  This medicine may be used for other purposes; ask your health care provider or pharmacist if you have questions.  COMMON BRAND NAME(S): Zithromax, Zithromax Tri-Kashif, Zithromax Z-Kashif  What should I tell my health care provider before I take this medicine?  They need to know if you have any of these conditions:  -kidney disease  -liver disease  -irregular heartbeat or heart disease  -an unusual or allergic reaction to azithromycin, erythromycin, other macrolide antibiotics, foods, dyes, or preservatives  -pregnant or trying to get pregnant  -breast-feeding  How should I use this medicine?  Take this medicine by mouth with a full glass of water. Follow the directions on the prescription label. The tablets can be taken with food or on an empty stomach. If the medicine upsets your stomach, take it with food. Take your medicine at regular intervals. Do not take your medicine more often than directed. Take all of your medicine as directed even if you think your are better. Do not skip doses or stop your medicine early.  Talk to  your pediatrician regarding the use of this medicine in children. While this drug may be prescribed for children as young as 6 months for selected conditions, precautions do apply.  Overdosage: If you think you have taken too much of this medicine contact a poison control center or emergency room at once.  NOTE: This medicine is only for you. Do not share this medicine with others.  What if I miss a dose?  If you miss a dose, take it as soon as you can. If it is almost time for your next dose, take only that dose. Do not take double or extra doses.  What may interact with this medicine?  Do not take this medicine with any of the following medications:  -lincomycin  This medicine may also interact with the following medications:  -amiodarone  -antacids  -birth control pills  -cyclosporine  -digoxin  -magnesium  -nelfinavir  -phenytoin  -warfarin  This list may not describe all possible interactions. Give your health care provider a list of all the medicines, herbs, non-prescription drugs, or dietary supplements you use. Also tell them if you smoke, drink alcohol, or use illegal drugs. Some items may interact with your medicine.  What should I watch for while using this medicine?  Tell your doctor or healthcare professional if your symptoms do not start to get better or if they get worse.  Do not treat diarrhea with over the counter products. Contact your doctor if you have diarrhea that lasts more than 2 days or if it is severe and watery.  This medicine can make you more sensitive to the sun. Keep out of the sun. If you cannot avoid being in the sun, wear protective clothing and use sunscreen. Do not use sun lamps or tanning beds/booths.  What side effects may I notice from receiving this medicine?  Side effects that you should report to your doctor or health care professional as soon as possible:  -allergic reactions like skin rash, itching or hives, swelling of the face, lips, or tongue  -confusion, nightmares or  hallucinations  -dark urine  -difficulty breathing  -hearing loss  -irregular heartbeat or chest pain  -pain or difficulty passing urine  -redness, blistering, peeling or loosening of the skin, including inside the mouth  -white patches or sores in the mouth  -yellowing of the eyes or skin  Side effects that usually do not require medical attention (report to your doctor or health care professional if they continue or are bothersome):  -diarrhea  -dizziness, drowsiness  -headache  -stomach upset or vomiting  -tooth discoloration  -vaginal irritation  This list may not describe all possible side effects. Call your doctor for medical advice about side effects. You may report side effects to FDA at 3-032-ANW-2946.  Where should I keep my medicine?  Keep out of the reach of children.  Store at room temperature between 15 and 30 degrees C (59 and 86 degrees F). Throw away any unused medicine after the expiration date.  NOTE: This sheet is a summary. It may not cover all possible information. If you have questions about this medicine, talk to your doctor, pharmacist, or health care provider.  © 2018 Elsevier/Gold Standard (2017-02-14 15:26:03)

## 2019-11-23 NOTE — DISCHARGE SUMMARY
Discharge Summary    CHIEF COMPLAINT ON ADMISSION  Chief Complaint   Patient presents with   • Leg Pain     left lower, started today   • Dizziness     intermittent over last few days   • Sent from Urgent Care       Reason for Admission  Dizzy     Admission Date  11/21/2019    CODE STATUS  Prior    HPI & HOSPITAL COURSE  As per dr goel h+p    64 y.o. female who presented on 11/21/2019 after being seen at the urgent care with complaints of dizziness and left leg weakness.  This is a older female with a history of hypertension and reported previous CVA 2 years ago who comes in with complaints as noted above.  The patient states that her symptoms began 3 days ago when she noted that her left leg was weaker than usual as she was walking and found that she was dragging her foot.  Her gait has been unstable because of this which led to a fall and she now also complains of a left ankle pain.  The patient states that she did not seek immediate medical assistance at that time and that the symptoms are different from her prior CVA.  During her prior CVA, she reports mental confusion and difficulty with words none of which have occurred.  She denies any residual deficits from her previous CVA.  Otherwise, the patient does also reported left lower extremity pain which is aggravated when she attempts to ambulate and has no alleviating factors.  She has not had any fevers, chills, headache, chest pain, shortness of breath, abdominal pain, diarrhea or dysuria.         During hospital stay patient received neurochecks PT and OT.  Patient had no new focal deficit.  MRI of the brain was unremarkable for acute CVA.    Patient is complaining of cough consistent with bronchitis.  He was sent home with p.o. Cipro    Therefore, she is discharged in good and stable condition to home with close outpatient follow-up.    The patient recovered much more quickly than anticipated on admission.    Discharge Date  11/22/2019    FOLLOW UP ITEMS  POST DISCHARGE      DISCHARGE DIAGNOSES  Principal Problem:    Ataxia POA: Yes  Active Problems:    HTN (hypertension) POA: Unknown    Bronchitis POA: Yes    History of CVA in adulthood POA: Yes    Tobacco abuse POA: Unknown  Resolved Problems:    * No resolved hospital problems. *      FOLLOW UP  No future appointments.  [address]  [place] [id number] [phone]  Schedule an appointment as soon as possible for a visit      Pcp Pt States None            MEDICATIONS ON DISCHARGE     Medication List      START taking these medications      Instructions   aspirin EC 81 MG Tbec  Commonly known as:  ECOTRIN   Take 1 Tab by mouth every day.  Dose:  81 mg     azithromycin 250 MG Tabs  Commonly known as:  ZITHROMAX   2 tabs PO on day ONE and then one tab po daily for next 5 days        CONTINUE taking these medications      Instructions   amLODIPine 10 MG Tabs  Commonly known as:  NORVASC   Take 10 mg by mouth every day.  Dose:  10 mg     CLARITIN 10 MG Caps  Generic drug:  Loratadine   Take 10 mg by mouth 1 time daily as needed.  Dose:  10 mg     fish oil 1000 MG Caps capsule   Take 1,000 mg by mouth 1 time daily as needed.  Dose:  1,000 mg            Allergies  No Known Allergies    DIET  No orders of the defined types were placed in this encounter.      ACTIVITY  As tolerated.  Weight bearing as tolerated    CONSULTATIONS    PROCEDURES   Show images for MR-BRAIN-W/O   MR-BRAIN-W/O [[id number]]     Electronically signed by: Trinidad Kiran M.D. on 11/21/19 1944 Status: Completed   Ordering user: Trinidad Kiran M.D. 11/21/19 1944 Ordering provider: Trinidad Kiran M.D.   Authorized by: Trinidad Kiran M.D. Ordered during: ED to Hosp-Admission (Discharged) on 11/21/2019    Add Signature Requirement   Frequency: Once 11/21/19 1944 - 1  occurrence Indications of use: TIA, initial exam   Questionnaire     Question Answer   Is the patient pregnant? No   Comments to Radiology Okay  for patient to be off telemetry monitoring for MRI   Does the patient require anesthesia or sedation? No Sedation   Order comments: MRI IS NOT COMPATIBLE WITH PACEMAKERS OR INTRACRANIAL ANEURYSM CLIPS. PATIENTS WHO HAVE WORKED IN MACHINE SHOPS MUST FIRST HAVE LIMITED PARANASAL SINUS X-RAYS BEFORE HAVING A MRI.     Reprint Order Requisition     MR-BRAIN-W/O (Order #[id number]) on 11/21/19   Last Resulted Time   Fri Nov 22, 2019  8:37 AM   Imaging Result Status     Status: Final result (Exam End: 11/22/2019  8:24 AM)   Imaging Previous Results     Open Hard Copy Result Report (Order #[id number] - MR-BRAIN-W/O)   Narrative & Impression        11/22/2019 7:58 AM     HISTORY/REASON FOR EXAM:  Dizziness and LEFT leg weakness, hypertension.        TECHNIQUE/EXAM DESCRIPTION:  MRI of the brain without contrast.     T1 sagittal, T2 fast spin-echo axial, FLAIR axial, T1 coronal, FLAIR coronal, Diffusion weighted and Apparent Diffusion Coefficient (ADC map) axial images were obtained of the whole brain.     The study was performed on a [employer] Signa 1.5 Yola MRI scanner.     COMPARISON:  CT 11/21/2019     FINDINGS:     There is diffuse prominence of the CSF containing spaces.     There are confluent areas of T2 prolongation in the deep and periventricular white matter which are nonspecific but which most likely reflect areas of chronic microangiopathic ischemic change, though this can also be seen with gliosis and demyelinating   processes.     There is a small area of encephalomalacia in the RIGHT frontal periventricular white matter.        The calvariae are unremarkable.  There are no extra-axial fluid collections.  The ventricular system and basal cisterns are within normal limits.  There are no other areas of abnormal signal in the brain substance.  There are no mass effects or shift of   midline structures.  There are no hemorrhagic lesions.  The diffusion weighted axial images show no evidence of acute cerebral  infarction.     The brainstem and posterior fossa structures are otherwise unremarkable.     Vascular flow voids in the vertebrobasilar and carotid arteries, [place], and dural venous sinuses are intact.     The paranasal sinuses and mastoids in the field of view are unremarkable.     IMPRESSION:     1.  No acute ischemia, hemorrhage or mass  2.  Small area of encephalomalacia in the RIGHT frontal periventricular white matter likely related to remote lacunar infarction  3.  Advanced white matter changes   IR Documentation Timeline (11/23/2019 09:42:40 to 11/23/2019 09:42:40)     Reading Provider Reading Date   Sin Arroyo M.D. Nov 22, 2019   Signing Provider Signing Date Signing Time   Sin Arroyo M.D. Nov 22, 2019  8:35 AM   Order Detail Report     MR-BRAIN-W/O (Order #[id number]) on 11/21/19   Order-Level Documents:     There are no order-level documents.   Encounter-Level Documents:     There are no encounter-level documents.   Order-Level Documents for Parent Order:     There are no order-level documents for parent order.   MR-BRAIN-W/O [[id number]]     Electronically signed by: Trinidad Kiran M.D. on 11/21/19 1944 Status: Completed   Ordering user: Trinidad Kiran M.D. 11/21/19 1944               LABORATORY  Lab Results   Component Value Date    SODIUM 138 11/22/2019    POTASSIUM 4.1 11/22/2019    CHLORIDE 106 11/22/2019    CO2 25 11/22/2019    GLUCOSE 114 (H) 11/22/2019    BUN 14 11/22/2019    CREATININE 0.72 11/22/2019        Lab Results   Component Value Date    WBC 6.4 11/22/2019    HEMOGLOBIN 15.0 11/22/2019    HEMATOCRIT 46.8 11/22/2019    PLATELETCT 222 11/22/2019        Total time of the discharge process exceeds 38 minutes.

## 2020-01-22 ENCOUNTER — APPOINTMENT (OUTPATIENT)
Dept: URGENT CARE | Facility: CLINIC | Age: 65
End: 2020-01-22
Payer: MEDICAID

## 2020-01-23 ENCOUNTER — OFFICE VISIT (OUTPATIENT)
Dept: URGENT CARE | Facility: CLINIC | Age: 65
End: 2020-01-23
Payer: MEDICAID

## 2020-01-23 VITALS
HEIGHT: 62 IN | TEMPERATURE: 97.8 F | BODY MASS INDEX: 36.29 KG/M2 | RESPIRATION RATE: 16 BRPM | OXYGEN SATURATION: 96 % | WEIGHT: 197.2 LBS | SYSTOLIC BLOOD PRESSURE: 126 MMHG | DIASTOLIC BLOOD PRESSURE: 78 MMHG | HEART RATE: 75 BPM

## 2020-01-23 DIAGNOSIS — R06.02 SOB (SHORTNESS OF BREATH): ICD-10-CM

## 2020-01-23 DIAGNOSIS — Z87.09 HISTORY OF BRONCHITIS: ICD-10-CM

## 2020-01-23 PROCEDURE — 94640 AIRWAY INHALATION TREATMENT: CPT | Performed by: NURSE PRACTITIONER

## 2020-01-23 PROCEDURE — 99204 OFFICE O/P NEW MOD 45 MIN: CPT | Mod: 25 | Performed by: NURSE PRACTITIONER

## 2020-01-23 PROCEDURE — 94760 N-INVAS EAR/PLS OXIMETRY 1: CPT | Performed by: NURSE PRACTITIONER

## 2020-01-23 RX ORDER — ALBUTEROL SULFATE 90 UG/1
1-2 AEROSOL, METERED RESPIRATORY (INHALATION) EVERY 4 HOURS PRN
Qty: 1 INHALER | Refills: 0 | Status: SHIPPED | OUTPATIENT
Start: 2020-01-23

## 2020-01-23 RX ORDER — IPRATROPIUM BROMIDE AND ALBUTEROL SULFATE 2.5; .5 MG/3ML; MG/3ML
3 SOLUTION RESPIRATORY (INHALATION) ONCE
Status: COMPLETED | OUTPATIENT
Start: 2020-01-23 | End: 2020-01-23

## 2020-01-23 RX ADMIN — IPRATROPIUM BROMIDE AND ALBUTEROL SULFATE 3 ML: 2.5; .5 SOLUTION RESPIRATORY (INHALATION) at 10:07

## 2020-01-23 ASSESSMENT — ENCOUNTER SYMPTOMS
SHORTNESS OF BREATH: 1
FEVER: 0
COUGH: 0
CONSTITUTIONAL NEGATIVE: 1
NEUROLOGICAL NEGATIVE: 1
CARDIOVASCULAR NEGATIVE: 1

## 2020-01-23 NOTE — PROGRESS NOTES
Subjective:     Lizzy Walton is a 64 y.o. female who presents for Shortness of Breath (x 3 days); Medication Refill (albuterol sulfate hfa inhalation aerosol 90 mcg per actuation ); and Other (refferal for primary care doctor )       Shortness of Breath   This is a new problem. Episode onset: 3 days ago. The problem has been gradually worsening. Pertinent negatives include no chest pain or fever. The symptoms are aggravated by exercise. She has tried beta agonist inhalers for the symptoms. The treatment provided mild (Running out, needs refill) relief. Her past medical history is significant for allergies. (Bronchitis)     PMH:  has a past medical history of Hypertension and Stroke (HCC).    MEDS:   Current Outpatient Medications:   •  albuterol 108 (90 Base) MCG/ACT Aero Soln inhalation aerosol, Inhale 1-2 Puffs by mouth every four hours as needed (shortness of breath and/or wheezing)., Disp: 1 Inhaler, Rfl: 0  •  aspirin EC (ECOTRIN) 81 MG Tablet Delayed Response, Take 1 Tab by mouth every day., Disp: 30 Tab, Rfl: 2  •  azithromycin (ZITHROMAX) 250 MG Tab, 2 tabs PO on day ONE and then one tab po daily for next 5 days, Disp: 6 Tab, Rfl: 0  •  amLODIPine (NORVASC) 10 MG Tab, Take 10 mg by mouth every day., Disp: , Rfl:   •  Loratadine (CLARITIN) 10 MG Cap, Take 10 mg by mouth 1 time daily as needed., Disp: , Rfl:   •  Omega-3 Fatty Acids (FISH OIL) 1000 MG Cap capsule, Take 1,000 mg by mouth 1 time daily as needed., Disp: , Rfl:     ALLERGIES: No Known Allergies    SURGHX: History reviewed. No pertinent surgical history.    SOCHX:  reports that she has been smoking cigarettes. She has been smoking about 0.00 packs per day. She has never used smokeless tobacco. She reports previous alcohol use. She reports previous drug use.     FH: Reviewed with patient, not pertinent to this visit.    Review of Systems   Constitutional: Negative.  Negative for fever and malaise/fatigue.   HENT: Negative.    Respiratory: Positive  "for shortness of breath. Negative for cough.    Cardiovascular: Negative.  Negative for chest pain.   Neurological: Negative.    All other systems reviewed and are negative.    Additional details per HPI.    Objective:     /78   Pulse 75   Temp 36.6 °C (97.8 °F) (Temporal)   Resp 16   Ht 1.575 m (5' 2\")   Wt 89.4 kg (197 lb 3.2 oz)   SpO2 96%   BMI 36.07 kg/m²     Physical Exam  Vitals signs reviewed.   Constitutional:       General: She is not in acute distress.     Appearance: She is well-developed. She is not toxic-appearing or diaphoretic.   HENT:      Head: Normocephalic and atraumatic.      Right Ear: External ear normal.      Left Ear: External ear normal.      Nose: Nose normal.   Eyes:      Extraocular Movements: Extraocular movements intact.      Conjunctiva/sclera: Conjunctivae normal.   Neck:      Musculoskeletal: Normal range of motion.   Cardiovascular:      Rate and Rhythm: Normal rate and regular rhythm.      Heart sounds: Normal heart sounds.   Pulmonary:      Effort: Pulmonary effort is normal. No respiratory distress.      Breath sounds: Decreased breath sounds present.   Abdominal:      General: Bowel sounds are normal.   Musculoskeletal: Normal range of motion.         General: No deformity.   Skin:     General: Skin is warm and dry.      Coloration: Skin is not pale.   Neurological:      Mental Status: She is alert and oriented to person, place, and time.      Sensory: No sensory deficit.      Coordination: Coordination normal.   Psychiatric:         Behavior: Behavior normal. Behavior is cooperative.          Assessment/Plan:     1. SOB (shortness of breath)  - ipratropium-albuterol (DUONEB) nebulizer solution  - albuterol 108 (90 Base) MCG/ACT Aero Soln inhalation aerosol; Inhale 1-2 Puffs by mouth every four hours as needed (shortness of breath and/or wheezing).  Dispense: 1 Inhaler; Refill: 0  - REFERRAL TO FAMILY PRACTICE    2. History of bronchitis    Duoneb given in clinic. " SpO2 improved to 96%. Lung sounds improved. Patient reports SOB improved.    Discussed close monitoring and supportive measures including increasing fluids and rest as well as OTC symptom management per 's instructions.    Rx as above sent electronically. Referral for PCP entered.    Vital signs stable, afebrile, asymptomatic, no acute distress.    Warning signs reviewed. Return precautions discussed.    Patient advised to: Return for 1) Symptoms don't improve or worsen, or go to ER, 2) Follow up with primary care in 7-10 days.    Differential diagnosis, natural history, supportive care, and indications for immediate follow-up discussed. All questions answered. Patient agrees with the plan of care.

## 2020-01-23 NOTE — PATIENT INSTRUCTIONS
Shortness of Breath, Adult  Shortness of breath is when a person has trouble breathing enough air, or when a person feels like she or he is having trouble breathing in enough air. Shortness of breath could be a sign of medical problem.  Follow these instructions at home:  Pay attention to any changes in your symptoms. Take these actions to help with your condition:  · Do not smoke. Smoking is a common cause of shortness of breath. If you smoke and you need help quitting, ask your health care provider.  · Avoid things that can irritate your airways, such as:  ¨ Mold.  ¨ Dust.  ¨ Air pollution.  ¨ Chemical fumes.  ¨ Things that can cause allergy symptoms (allergens), if you have allergies.  · Keep your living space clean and free of mold and dust.  · Rest as needed. Slowly return to your usual activities.  · Take over-the-counter and prescription medicines, including oxygen and inhaled medicines, only as told by your health care provider.  · Keep all follow-up visits as told by your health care provider. This is important.  Contact a health care provider if:  · Your condition does not improve as soon as expected.  · You have a hard time doing your normal activities, even after you rest.  · You have new symptoms.  Get help right away if:  · Your shortness of breath gets worse.  · You have shortness of breath when you are resting.  · You feel light-headed or you faint.  · You have a cough that is not controlled with medicines.  · You cough up blood.  · You have pain with breathing.  · You have pain in your chest, arms, shoulders, or abdomen.  · You have a fever.  · You cannot walk up stairs or exercise the way that you normally do.  This information is not intended to replace advice given to you by your health care provider. Make sure you discuss any questions you have with your health care provider.  Document Released: 09/12/2002 Document Revised: 07/08/2017 Document Reviewed: 05/25/2017  [employer] Patient  Education © 2017 [employer]

## 2020-06-08 ENCOUNTER — HOSPITAL ENCOUNTER (INPATIENT)
Facility: MEDICAL CENTER | Age: 65
LOS: 1 days | DRG: 153 | End: 2020-06-10
Attending: EMERGENCY MEDICINE | Admitting: INTERNAL MEDICINE
Payer: MEDICAID

## 2020-06-08 ENCOUNTER — APPOINTMENT (OUTPATIENT)
Dept: RADIOLOGY | Facility: MEDICAL CENTER | Age: 65
DRG: 153 | End: 2020-06-08
Attending: EMERGENCY MEDICINE
Payer: MEDICAID

## 2020-06-08 DIAGNOSIS — J02.0 STREP PHARYNGITIS: ICD-10-CM

## 2020-06-08 DIAGNOSIS — J36 PERITONSILLAR ABSCESS: ICD-10-CM

## 2020-06-08 DIAGNOSIS — E04.1 THYROID NODULE: ICD-10-CM

## 2020-06-08 LAB
ALBUMIN SERPL BCP-MCNC: 3.8 G/DL (ref 3.2–4.9)
ALBUMIN/GLOB SERPL: 0.6 G/DL
ALP SERPL-CCNC: 233 U/L (ref 30–99)
ALT SERPL-CCNC: 93 U/L (ref 2–50)
ANION GAP SERPL CALC-SCNC: 15 MMOL/L (ref 7–16)
AST SERPL-CCNC: 107 U/L (ref 12–45)
BASOPHILS # BLD AUTO: 0.5 % (ref 0–1.8)
BASOPHILS # BLD: 0.07 K/UL (ref 0–0.12)
BILIRUB SERPL-MCNC: 0.5 MG/DL (ref 0.1–1.5)
BUN SERPL-MCNC: 10 MG/DL (ref 8–22)
CALCIUM SERPL-MCNC: 9.4 MG/DL (ref 8.5–10.5)
CHLORIDE SERPL-SCNC: 99 MMOL/L (ref 96–112)
CO2 SERPL-SCNC: 22 MMOL/L (ref 20–33)
CREAT SERPL-MCNC: 0.61 MG/DL (ref 0.5–1.4)
EOSINOPHIL # BLD AUTO: 0.15 K/UL (ref 0–0.51)
EOSINOPHIL NFR BLD: 1 % (ref 0–6.9)
ERYTHROCYTE [DISTWIDTH] IN BLOOD BY AUTOMATED COUNT: 46.8 FL (ref 35.9–50)
GLOBULIN SER CALC-MCNC: 5.9 G/DL (ref 1.9–3.5)
GLUCOSE SERPL-MCNC: 116 MG/DL (ref 65–99)
HCT VFR BLD AUTO: 55.6 % (ref 37–47)
HGB BLD-MCNC: 17.5 G/DL (ref 12–16)
IMM GRANULOCYTES # BLD AUTO: 0.11 K/UL (ref 0–0.11)
IMM GRANULOCYTES NFR BLD AUTO: 0.8 % (ref 0–0.9)
LYMPHOCYTES # BLD AUTO: 2.94 K/UL (ref 1–4.8)
LYMPHOCYTES NFR BLD: 20.2 % (ref 22–41)
MCH RBC QN AUTO: 28.3 PG (ref 27–33)
MCHC RBC AUTO-ENTMCNC: 31.5 G/DL (ref 33.6–35)
MCV RBC AUTO: 89.8 FL (ref 81.4–97.8)
MONOCYTES # BLD AUTO: 1.3 K/UL (ref 0–0.85)
MONOCYTES NFR BLD AUTO: 9 % (ref 0–13.4)
NEUTROPHILS # BLD AUTO: 9.95 K/UL (ref 2–7.15)
NEUTROPHILS NFR BLD: 68.5 % (ref 44–72)
NRBC # BLD AUTO: 0 K/UL
NRBC BLD-RTO: 0 /100 WBC
PLATELET # BLD AUTO: 320 K/UL (ref 164–446)
PMV BLD AUTO: 10.3 FL (ref 9–12.9)
POTASSIUM SERPL-SCNC: 4.1 MMOL/L (ref 3.6–5.5)
PROT SERPL-MCNC: 9.7 G/DL (ref 6–8.2)
RBC # BLD AUTO: 6.19 M/UL (ref 4.2–5.4)
S PYO DNA SPEC NAA+PROBE: DETECTED
SODIUM SERPL-SCNC: 136 MMOL/L (ref 135–145)
WBC # BLD AUTO: 14.5 K/UL (ref 4.8–10.8)

## 2020-06-08 PROCEDURE — 700105 HCHG RX REV CODE 258: Performed by: EMERGENCY MEDICINE

## 2020-06-08 PROCEDURE — 96365 THER/PROPH/DIAG IV INF INIT: CPT

## 2020-06-08 PROCEDURE — 96376 TX/PRO/DX INJ SAME DRUG ADON: CPT

## 2020-06-08 PROCEDURE — 700111 HCHG RX REV CODE 636 W/ 250 OVERRIDE (IP): Performed by: STUDENT IN AN ORGANIZED HEALTH CARE EDUCATION/TRAINING PROGRAM

## 2020-06-08 PROCEDURE — 99220 PR INITIAL OBSERVATION CARE,LEVL III: CPT | Mod: GC | Performed by: INTERNAL MEDICINE

## 2020-06-08 PROCEDURE — G0378 HOSPITAL OBSERVATION PER HR: HCPCS

## 2020-06-08 PROCEDURE — 302128 INFUSION PUMP: Performed by: HOSPITALIST

## 2020-06-08 PROCEDURE — 87651 STREP A DNA AMP PROBE: CPT

## 2020-06-08 PROCEDURE — 70491 CT SOFT TISSUE NECK W/DYE: CPT

## 2020-06-08 PROCEDURE — A9270 NON-COVERED ITEM OR SERVICE: HCPCS | Performed by: STUDENT IN AN ORGANIZED HEALTH CARE EDUCATION/TRAINING PROGRAM

## 2020-06-08 PROCEDURE — 700105 HCHG RX REV CODE 258: Performed by: STUDENT IN AN ORGANIZED HEALTH CARE EDUCATION/TRAINING PROGRAM

## 2020-06-08 PROCEDURE — 87040 BLOOD CULTURE FOR BACTERIA: CPT

## 2020-06-08 PROCEDURE — 700117 HCHG RX CONTRAST REV CODE 255: Performed by: EMERGENCY MEDICINE

## 2020-06-08 PROCEDURE — 99285 EMERGENCY DEPT VISIT HI MDM: CPT

## 2020-06-08 PROCEDURE — 302242 IV POLE: Performed by: HOSPITALIST

## 2020-06-08 PROCEDURE — 85025 COMPLETE CBC W/AUTO DIFF WBC: CPT

## 2020-06-08 PROCEDURE — 700102 HCHG RX REV CODE 250 W/ 637 OVERRIDE(OP): Performed by: STUDENT IN AN ORGANIZED HEALTH CARE EDUCATION/TRAINING PROGRAM

## 2020-06-08 PROCEDURE — 96375 TX/PRO/DX INJ NEW DRUG ADDON: CPT

## 2020-06-08 PROCEDURE — 700111 HCHG RX REV CODE 636 W/ 250 OVERRIDE (IP): Performed by: EMERGENCY MEDICINE

## 2020-06-08 PROCEDURE — 80053 COMPREHEN METABOLIC PANEL: CPT

## 2020-06-08 RX ORDER — AMLODIPINE BESYLATE 10 MG/1
10 TABLET ORAL
Status: DISCONTINUED | OUTPATIENT
Start: 2020-06-08 | End: 2020-06-10 | Stop reason: HOSPADM

## 2020-06-08 RX ORDER — SODIUM CHLORIDE 9 MG/ML
INJECTION, SOLUTION INTRAVENOUS CONTINUOUS
Status: DISCONTINUED | OUTPATIENT
Start: 2020-06-08 | End: 2020-06-09

## 2020-06-08 RX ORDER — LABETALOL HYDROCHLORIDE 5 MG/ML
10 INJECTION, SOLUTION INTRAVENOUS EVERY 4 HOURS PRN
Status: DISCONTINUED | OUTPATIENT
Start: 2020-06-08 | End: 2020-06-10 | Stop reason: HOSPADM

## 2020-06-08 RX ORDER — LISINOPRIL 5 MG/1
2.5 TABLET ORAL DAILY
COMMUNITY

## 2020-06-08 RX ORDER — AMLODIPINE BESYLATE 10 MG/1
10 TABLET ORAL DAILY
COMMUNITY

## 2020-06-08 RX ORDER — MORPHINE SULFATE 4 MG/ML
2 INJECTION, SOLUTION INTRAMUSCULAR; INTRAVENOUS EVERY 4 HOURS PRN
Status: DISCONTINUED | OUTPATIENT
Start: 2020-06-08 | End: 2020-06-10 | Stop reason: HOSPADM

## 2020-06-08 RX ORDER — POLYETHYLENE GLYCOL 3350 17 G/17G
1 POWDER, FOR SOLUTION ORAL
Status: DISCONTINUED | OUTPATIENT
Start: 2020-06-08 | End: 2020-06-10 | Stop reason: HOSPADM

## 2020-06-08 RX ORDER — ASPIRIN 81 MG/1
81 TABLET, CHEWABLE ORAL DAILY
Status: DISCONTINUED | OUTPATIENT
Start: 2020-06-08 | End: 2020-06-10 | Stop reason: HOSPADM

## 2020-06-08 RX ORDER — ACETAMINOPHEN 325 MG/1
650 TABLET ORAL EVERY 6 HOURS PRN
Status: DISCONTINUED | OUTPATIENT
Start: 2020-06-08 | End: 2020-06-10 | Stop reason: HOSPADM

## 2020-06-08 RX ORDER — AMOXICILLIN 250 MG
2 CAPSULE ORAL 2 TIMES DAILY
Status: DISCONTINUED | OUTPATIENT
Start: 2020-06-08 | End: 2020-06-10 | Stop reason: HOSPADM

## 2020-06-08 RX ORDER — LISINOPRIL 2.5 MG/1
2.5 TABLET ORAL
Status: DISCONTINUED | OUTPATIENT
Start: 2020-06-08 | End: 2020-06-10

## 2020-06-08 RX ORDER — LORATADINE 10 MG/1
10 TABLET ORAL DAILY
Status: ON HOLD | COMMUNITY
End: 2020-06-10

## 2020-06-08 RX ORDER — ALBUTEROL SULFATE 90 UG/1
1-2 AEROSOL, METERED RESPIRATORY (INHALATION) EVERY 4 HOURS PRN
Status: DISCONTINUED | OUTPATIENT
Start: 2020-06-08 | End: 2020-06-10 | Stop reason: HOSPADM

## 2020-06-08 RX ORDER — MORPHINE SULFATE 4 MG/ML
4 INJECTION, SOLUTION INTRAMUSCULAR; INTRAVENOUS ONCE
Status: COMPLETED | OUTPATIENT
Start: 2020-06-08 | End: 2020-06-08

## 2020-06-08 RX ORDER — BISACODYL 10 MG
10 SUPPOSITORY, RECTAL RECTAL
Status: DISCONTINUED | OUTPATIENT
Start: 2020-06-08 | End: 2020-06-10 | Stop reason: HOSPADM

## 2020-06-08 RX ORDER — ASPIRIN 81 MG/1
81 TABLET, CHEWABLE ORAL DAILY
COMMUNITY

## 2020-06-08 RX ORDER — MORPHINE SULFATE 10 MG/5ML
2.5 SOLUTION ORAL
Status: DISCONTINUED | OUTPATIENT
Start: 2020-06-08 | End: 2020-06-10 | Stop reason: HOSPADM

## 2020-06-08 RX ORDER — ONDANSETRON 2 MG/ML
4 INJECTION INTRAMUSCULAR; INTRAVENOUS ONCE
Status: COMPLETED | OUTPATIENT
Start: 2020-06-08 | End: 2020-06-08

## 2020-06-08 RX ADMIN — SODIUM CHLORIDE: 9 INJECTION, SOLUTION INTRAVENOUS at 16:15

## 2020-06-08 RX ADMIN — SODIUM CHLORIDE 3 G: 900 INJECTION INTRAVENOUS at 17:30

## 2020-06-08 RX ADMIN — MORPHINE SULFATE 2 MG: 4 INJECTION INTRAVENOUS at 14:32

## 2020-06-08 RX ADMIN — IOHEXOL 75 ML: 350 INJECTION, SOLUTION INTRAVENOUS at 12:27

## 2020-06-08 RX ADMIN — LISINOPRIL 2.5 MG: 2.5 TABLET ORAL at 17:30

## 2020-06-08 RX ADMIN — AMLODIPINE BESYLATE 10 MG: 10 TABLET ORAL at 16:15

## 2020-06-08 RX ADMIN — MORPHINE SULFATE 4 MG: 4 INJECTION INTRAVENOUS at 11:21

## 2020-06-08 RX ADMIN — ONDANSETRON 4 MG: 2 INJECTION INTRAMUSCULAR; INTRAVENOUS at 11:21

## 2020-06-08 RX ADMIN — MORPHINE SULFATE 2 MG: 4 INJECTION INTRAVENOUS at 21:22

## 2020-06-08 RX ADMIN — SODIUM CHLORIDE 3 G: 900 INJECTION INTRAVENOUS at 12:29

## 2020-06-08 RX ADMIN — ASPIRIN 81 MG 81 MG: 81 TABLET ORAL at 17:30

## 2020-06-08 ASSESSMENT — ENCOUNTER SYMPTOMS
VOMITING: 0
MUSCULOSKELETAL NEGATIVE: 1
ORTHOPNEA: 0
EYE REDNESS: 0
STRIDOR: 0
BLURRED VISION: 0
CHILLS: 0
NAUSEA: 0
FEVER: 0
SHORTNESS OF BREATH: 1
COUGH: 0
PHOTOPHOBIA: 0
HEADACHES: 0
SINUS PAIN: 0
FOCAL WEAKNESS: 0
WHEEZING: 0
DOUBLE VISION: 0
PALPITATIONS: 0
MYALGIAS: 0
SORE THROAT: 1
CONSTIPATION: 0
ABDOMINAL PAIN: 0
DEPRESSION: 0
NEUROLOGICAL NEGATIVE: 1
SHORTNESS OF BREATH: 0
EYE DISCHARGE: 0
DIARRHEA: 0
NECK PAIN: 1

## 2020-06-08 ASSESSMENT — LIFESTYLE VARIABLES
EVER HAD A DRINK FIRST THING IN THE MORNING TO STEADY YOUR NERVES TO GET RID OF A HANGOVER: NO
CONSUMPTION TOTAL: NEGATIVE
HAVE PEOPLE ANNOYED YOU BY CRITICIZING YOUR DRINKING: NO
AVERAGE NUMBER OF DAYS PER WEEK YOU HAVE A DRINK CONTAINING ALCOHOL: 0
TOTAL SCORE: 0
SUBSTANCE_ABUSE: 0
PACK_YEARS: 25
EVER FELT BAD OR GUILTY ABOUT YOUR DRINKING: NO
ON A TYPICAL DAY WHEN YOU DRINK ALCOHOL HOW MANY DRINKS DO YOU HAVE: 0
DOES PATIENT WANT TO STOP DRINKING: NO
TOTAL SCORE: 0
DOES PATIENT WANT TO STOP DRINKING: NO
EVER_SMOKED: YES
TOTAL SCORE: 0
HAVE YOU EVER FELT YOU SHOULD CUT DOWN ON YOUR DRINKING: NO
HOW MANY TIMES IN THE PAST YEAR HAVE YOU HAD 5 OR MORE DRINKS IN A DAY: 0
ALCOHOL_USE: NO

## 2020-06-08 ASSESSMENT — FIBROSIS 4 INDEX
FIB4 SCORE: 2.22
FIB4 SCORE: 2.91

## 2020-06-08 ASSESSMENT — PATIENT HEALTH QUESTIONNAIRE - PHQ9
1. LITTLE INTEREST OR PLEASURE IN DOING THINGS: NOT AT ALL
2. FEELING DOWN, DEPRESSED, IRRITABLE, OR HOPELESS: NOT AT ALL
SUM OF ALL RESPONSES TO PHQ9 QUESTIONS 1 AND 2: 0

## 2020-06-08 NOTE — ED NOTES
Med Rec completed per patient's home pharmacy ([facility])  Unable to reach pt   Allergies reviewed  No ORAL antibiotics in last 14 days

## 2020-06-08 NOTE — NON-PROVIDER
History & Physical Note    Date of Admission: 6/8/2020  Admission Status: Observation-Outpatient  [facility] Team: [facility]  Attending: Chirag Ravi M.D.   Senior Resident: Dr. Rand  Intern: Dr. Zoila Morris  Contact Number: [phone]    Chief Complaint: Sore throat     History of Present Illness (HPI):   Lizzy is a 64 y.o. female who presented 6/8/2020 with gradually worsening sore throat for the past 4 days. Pain is rated 10/10, worse on the left side, and non-radiating. Pain is worse with swallowing. Patient also describes voice changes with a small amount of drooling today. No difficulty swallowing. She has a history of snoring. No ear pain, rhinorrhea, cough, fevers, chills, neck stiffness, or difficulty breathing.      ED Course:   Patient was treated with pain medication and IV antibiotics. Received a CT neck and group A strep test. ENT, Dr. Corona, was consulted.     Review of Systems:  Review of Systems   Constitutional: Positive for malaise/fatigue. Negative for chills and fever.   HENT: Positive for sore throat. Negative for congestion, ear pain, hearing loss and sinus pain.    Eyes: Negative for blurred vision, double vision, discharge and redness.   Respiratory: Positive for shortness of breath. Negative for cough and stridor.         Chronic       Cardiovascular: Negative for chest pain, palpitations, orthopnea and leg swelling.   Gastrointestinal: Negative for abdominal pain, constipation, diarrhea, nausea and vomiting.   Genitourinary: Negative.    Musculoskeletal: Negative.    Skin: Negative.    Neurological: Negative.    Endo/Heme/Allergies:        Increased thirst. Cold intolerance.   Psychiatric/Behavioral: Negative for substance abuse.       Past Medical History:   Past Medical History was reviewed with patient.   has a past medical history of Hypertension and Stroke (HCC). Hyperthyroidism.    Past Surgical History: Past Surgical History was reviewed with  patient.  Cholecystectomy.     Medications: Medications have been reviewed with patient.  Prior to Admission Medications   Prescriptions Last Dose Informant Patient Reported? Taking?   albuterol 108 (90 Base) MCG/ACT Aero Soln inhalation aerosol UNK at UNK  No No   Sig: Inhale 1-2 Puffs by mouth every four hours as needed (shortness of breath and/or wheezing).      Facility-Administered Medications: None   Amlodipine  Lisinopril     Allergies: Allergies have been reviewed with patient.  No Known Allergies    Family History:   Brother- Diabetes, Thyroid issues    Social History:   Tobacco: 1 pack per 2 weeks for 42 years  Alcohol: None  Recreational drugs (illegal and prescription):  None  Employment: Disabeled  Activity Level: Completes ADLs independently   Living situation:  Lives alone in an apartment locally. No pets  Recent travel:  None  Primary Care Provider: not reviewed Pcp Pt States None  Other (stressors, spirituality, exposures):    Physical Exam:  Vitals:  Temp:  [37.6 °C (99.6 °F)] 37.6 °C (99.6 °F)  Pulse:  [103-105] 105  Resp:  [14-25] 18  BP: (149-172)/(70-91) 153/81  SpO2:  [93 %-96 %] 95 %    Physical Exam  Constitutional:       General: She is not in acute distress.     Appearance: She is obese. She is ill-appearing.   HENT:      Head:      Comments: Swelling, tenderness to palpation, and pain on movement to left neck.      Mouth/Throat:      Mouth: Mucous membranes are moist. No oral lesions.      Pharynx: Posterior oropharyngeal erythema (mild) present. No oropharyngeal exudate.      Tonsils: No tonsillar exudate.   Eyes:      Conjunctiva/sclera: Conjunctivae normal.      Pupils: Pupils are equal, round, and reactive to light.   Neck:      Musculoskeletal: Normal range of motion and neck supple. Muscular tenderness present.   Cardiovascular:      Rate and Rhythm: Tachycardia present.      Pulses:           Radial pulses are 2+ on the right side.      Heart sounds: S1 normal and S2 normal. Murmur  present. Systolic murmur present with a grade of 2/6.   Pulmonary:      Effort: No respiratory distress.      Breath sounds: No stridor. No wheezing or rales.   Abdominal:      General: Abdomen is flat.      Palpations: Abdomen is soft. There is no mass.      Tenderness: There is no abdominal tenderness.   Musculoskeletal:      Right lower leg: No edema.      Left lower leg: No edema.   Lymphadenopathy:      Cervical: No cervical adenopathy.   Skin:     General: Skin is warm and dry.      Capillary Refill: Capillary refill takes less than 2 seconds.      Findings: No bruising or rash.   Neurological:      General: No focal deficit present.      Mental Status: She is alert and oriented to person, place, and time.   Psychiatric:         Mood and Affect: Mood normal.         Behavior: Behavior normal.         Labs:   Results for OK NARVAEZ (MRN [id number]) as of 6/8/2020 16:19   Ref. Range 6/8/2020 11:00   WBC Latest Ref Range: 4.8 - 10.8 K/uL 14.5 (H)   RBC Latest Ref Range: 4.20 - 5.40 M/uL 6.19 (H)   Hemoglobin Latest Ref Range: 12.0 - 16.0 g/dL 17.5 (H)   Hematocrit Latest Ref Range: 37.0 - 47.0 % 55.6 (H)   MCV Latest Ref Range: 81.4 - 97.8 fL 89.8   MCH Latest Ref Range: 27.0 - 33.0 pg 28.3   MCHC Latest Ref Range: 33.6 - 35.0 g/dL 31.5 (L)   RDW Latest Ref Range: 35.9 - 50.0 fL 46.8   Platelet Count Latest Ref Range: 164 - 446 K/uL 320   MPV Latest Ref Range: 9.0 - 12.9 fL 10.3   Neutrophils-Polys Latest Ref Range: 44.00 - 72.00 % 68.50   Neutrophils (Absolute) Latest Ref Range: 2.00 - 7.15 K/uL 9.95 (H)   Lymphocytes Latest Ref Range: 22.00 - 41.00 % 20.20 (L)   Lymphs (Absolute) Latest Ref Range: 1.00 - 4.80 K/uL 2.94   Monocytes Latest Ref Range: 0.00 - 13.40 % 9.00   Monos (Absolute) Latest Ref Range: 0.00 - 0.85 K/uL 1.30 (H)   Eosinophils Latest Ref Range: 0.00 - 6.90 % 1.00   Eos (Absolute) Latest Ref Range: 0.00 - 0.51 K/uL 0.15   Basophils Latest Ref Range: 0.00 - 1.80 % 0.50   Baso (Absolute) Latest  Ref Range: 0.00 - 0.12 K/uL 0.07   Immature Granulocytes Latest Ref Range: 0.00 - 0.90 % 0.80   Immature Granulocytes (abs) Latest Ref Range: 0.00 - 0.11 K/uL 0.11   Nucleated RBC Latest Units: /100 WBC 0.00   NRBC (Absolute) Latest Units: K/uL 0.00   Sodium Latest Ref Range: 135 - 145 mmol/L 136   Potassium Latest Ref Range: 3.6 - 5.5 mmol/L 4.1   Chloride Latest Ref Range: 96 - 112 mmol/L 99   Co2 Latest Ref Range: 20 - 33 mmol/L 22   Anion Gap Latest Ref Range: 7.0 - 16.0  15.0   Glucose Latest Ref Range: 65 - 99 mg/dL 116 (H)   Bun Latest Ref Range: 8 - 22 mg/dL 10   Creatinine Latest Ref Range: 0.50 - 1.40 mg/dL 0.61   GFR If  Latest Ref Range: >60 mL/min/1.73 m 2 >60   GFR If Non  Latest Ref Range: >60 mL/min/1.73 m 2 >60   Calcium Latest Ref Range: 8.5 - 10.5 mg/dL 9.4   AST(SGOT) Latest Ref Range: 12 - 45 U/L 107 (H)   ALT(SGPT) Latest Ref Range: 2 - 50 U/L 93 (H)   Alkaline Phosphatase Latest Ref Range: 30 - 99 U/L 233 (H)   Total Bilirubin Latest Ref Range: 0.1 - 1.5 mg/dL 0.5   Albumin Latest Ref Range: 3.2 - 4.9 g/dL 3.8   Total Protein Latest Ref Range: 6.0 - 8.2 g/dL 9.7 (H)   Globulin Latest Ref Range: 1.9 - 3.5 g/dL 5.9 (H)   A-G Ratio Latest Units: g/dL 0.6     Results for OK NARVAEZ (MRN [id number]) as of 2020 16:19   Ref. Range 2020 11:26   Group A Strep by PCR Latest Ref Range: Not Detected  DETECTED (A)       Imagin2020 12:08 PM     HISTORY/REASON FOR EXAM:  Sore throat for 4 days.        TECHNIQUE/EXAM DESCRIPTION AND NUMBER OF VIEWS:  CT soft tissue neck with contrast.     The study was performed on a helical multidetector CT scanner. Contiguous thin section helical images were obtained of the neck from the skull base through the thoracic inlet.     75 mL of Omnipaque 350 nonionic contrast was injected intravenously.     Low dose optimization technique was utilized for this CT exam including automated exposure control and adjustment of the mA  and/or kV according to patient size.     COMPARISON: None.     FINDINGS:  There is asymmetric enlargement and enhancement of the left tonsillar tissue inferiorly with a low-attenuation area within the tissue measuring 9 mm in diameter which may represent a developing peritonsillar abscess.  No right-sided tonsillar low-attenuation areas present.     There is no pathologic adenopathy evident.  There is an increased number of normal-sized lymph nodes in the left anterior cervical chain which may represent reactive adenopathy. Vascular enhancement of the cervical carotid and vertebral arteries and internal jugular veins appears intact.     No posterior pharyngeal or laryngeal mucosal mass is present.  The larynx and trachea are unremarkable. The prevertebral soft tissues appear intact. The parapharyngeal spaces show normal symmetry and fat planes. The pterygopalatine and infratemporal fossae appear intact.     The tongue and floor of the mouth are unremarkable.  The parotid and submandibular glands show normal size and density. No abnormal calcifications are evident.     The thyroid gland demonstrates a 12 mm in diameter low-attenuation area in the lower pole of the left lobe which represent hypoechoic nodule or cyst. Structures at the thoracic inlet and superior mediastinum within the field of view are unremarkable.     The bony structures show no particular abnormality.  No intracranial abnormalities are evident.     IMPRESSION:     1.  Left tonsillitis with possible developing 9 mm in diameter peritonsillar abscess.     2.  No airway compromise.     3.  Reactive appearing adenopathy in the left anterior cervical lymph node chain.     4.  12 mm diameter low-attenuation area in the lower pole the left lobe of the thyroid gland which may represent thyroid cyst or solid nodule.    Previous Data Review: reviewed    Assessment & Plan    Sore throat:  64 year old female with 4 days of gradually progressive throat pain and  swelling.  CT neck: 9mm left sided peritonsillar abscess  WBC count: 14.5  Group A Strep: Positive    IV ampicillin/sulbactam  Morphine Q4HRS for pain  Soft liquid diet  Blood culture  Follow up with ENT outpatient    HTN:  Chronic hypertension on home medication.     Continue Lisinopril 2.5 Q Day and Amlodipine 10mg Q Day     Thyroid nodule:  Follow up with ENT outpatient

## 2020-06-08 NOTE — ED NOTES
Pt. In bed ERP updated patient on plan of care, pt. Denies needs at this time, safety precautions in place, will continue to monitor.

## 2020-06-08 NOTE — ED TRIAGE NOTES
Pt. States sore throats for 4 days, pt. Was supposed to see PCP but stated that throat hurt too much. No trauma noted.

## 2020-06-08 NOTE — ED PROVIDER NOTES
ED Provider Note    CHIEF COMPLAINT  Chief Complaint   Patient presents with   • Sore Throat     x 4 days, denies FLu symptoms       HPI  Lizzy Walton is a 64 y.o. female who presents complaining of a sore throat that is been getting progressively worse over the last 4 days.  She states she is having a lot of pain with swallowing worse on the left side and has noticed some voice changes.  States her pain is 10 out of 10.  She denies any chest pains or shortness of breath.  She denies any leg swelling.  She denies having any fevers or chills. She has also noticed some swelling on the left side of her neck.  Denies neck stiffness.      REVIEW OF SYSTEMS  HEENT:  No ear pain, congestion positive sore throat   EYES: no discharge redness or vision changes  CARDIAC: no chest pain, palpitations    PULMONARY: no dyspnea, cough or congestion   GI: no vomiting diarrhea or abdominal pain   : no dysuria, back pain or hematuria   Neuro: no weakness, numbness aphasia or headache  Musculoskeletal: no swelling deformity or pain no joint swelling  Endocrine: no fevers, sweating, weight loss   SKIN: no rash, erythema or contusions     See history of present illness all other systems are negative        PAST MEDICAL HISTORY  Past Medical History:   Diagnosis Date   • Hypertension    • Stroke (HCC)        FAMILY HISTORY  History reviewed. No pertinent family history.    SOCIAL HISTORY  Social History     Socioeconomic History   • Marital status: Single     Spouse name: Not on file   • Number of children: Not on file   • Years of education: Not on file   • Highest education level: Not on file   Occupational History   • Not on file   Social Needs   • Financial resource strain: Not on file   • Food insecurity     Worry: Not on file     Inability: Not on file   • Transportation needs     Medical: Not on file     Non-medical: Not on file   Tobacco Use   • Smoking status: Current Every Day Smoker     Packs/day: 0.00     Types: Cigarettes  "  • Smokeless tobacco: Never Used   Substance and Sexual Activity   • Alcohol use: Not Currently   • Drug use: Not Currently   • Sexual activity: Not on file   Lifestyle   • Physical activity     Days per week: Not on file     Minutes per session: Not on file   • Stress: Not on file   Relationships   • Social connections     Talks on phone: Not on file     Gets together: Not on file     Attends Buddhist service: Not on file     Active member of club or organization: Not on file     Attends meetings of clubs or organizations: Not on file     Relationship status: Not on file   • Intimate partner violence     Fear of current or ex partner: Not on file     Emotionally abused: Not on file     Physically abused: Not on file     Forced sexual activity: Not on file   Other Topics Concern   • Not on file   Social History Narrative   • Not on file       SURGICAL HISTORY  No past surgical history on file.    CURRENT MEDICATIONS  Home Medications    **Home medications have not yet been reviewed for this encounter**         ALLERGIES  No Known Allergies    PHYSICAL EXAM  VITAL SIGNS: BP (!) 172/91   Pulse (!) 105   Temp 37.6 °C (99.6 °F) (Temporal)   Resp 20   Ht 1.575 m (5' 2\")   Wt 113.4 kg (250 lb)   SpO2 94%   BMI 45.73 kg/m²    Constitutional: Well developed, Well nourished, No acute respiratory distress, Non-toxic appearance.  Uncomfortable appearing  HENT: Normocephalic, Atraumatic, Bilateral external ears normal, Oropharynx edematous with an enlarged left tonsil pressing on the uvula with peritonsillar fullness on the left side of the jaw some left-sided neck swelling and lymphadenopathy, mild trismus,  mucous membranes are moist.  Eyes: Conjunctiva normal, No discharge. No icterus.  Neck: Normal range of motion. Supple.  Lymphatic:positive cervical lymphadenopathy noted.   Cardiovascular: Normal heart rate, Normal rhythm, No murmurs, No rubs, No gallops.   Thorax & Lungs: Clear to auscultation bilaterally, No " respiratory distress, No wheezing.  Skin: Warm, Dry, No erythema, No rash.   Extremities: Intact distal pulses, No edema, No tenderness  Musculoskeletal: Good range of motion in all major joints. Normal gait.  Neurologic: Alert & oriented x 3. No focal deficits noted.        RADIOLOGY/PROCEDURES  CT-SOFT TISSUE NECK WITH   Final Result      1.  Left tonsillitis with possible developing 9 mm in diameter peritonsillar abscess.      2.  No airway compromise.      3.  Reactive appearing adenopathy in the left anterior cervical lymph node chain.      4.  12 mm diameter low-attenuation area in the lower pole the left lobe of the thyroid gland which may represent thyroid cyst or solid nodule.            COURSE & MEDICAL DECISION MAKING  Pertinent Labs & Imaging studies reviewed. (See chart for details)  Differential diagnosis: Peritonsillar abscess, tonsillitis epiglottitis dental caries    IV was started and labs were drawn.  I gave the patient Unasyn as well as morphine and Zofran.    Results for orders placed or performed during the hospital encounter of 06/08/20   CBC WITH DIFFERENTIAL   Result Value Ref Range    WBC 14.5 (H) 4.8 - 10.8 K/uL    RBC 6.19 (H) 4.20 - 5.40 M/uL    Hemoglobin 17.5 (H) 12.0 - 16.0 g/dL    Hematocrit 55.6 (H) 37.0 - 47.0 %    MCV 89.8 81.4 - 97.8 fL    MCH 28.3 27.0 - 33.0 pg    MCHC 31.5 (L) 33.6 - 35.0 g/dL    RDW 46.8 35.9 - 50.0 fL    Platelet Count 320 164 - 446 K/uL    MPV 10.3 9.0 - 12.9 fL    Neutrophils-Polys 68.50 44.00 - 72.00 %    Lymphocytes 20.20 (L) 22.00 - 41.00 %    Monocytes 9.00 0.00 - 13.40 %    Eosinophils 1.00 0.00 - 6.90 %    Basophils 0.50 0.00 - 1.80 %    Immature Granulocytes 0.80 0.00 - 0.90 %    Nucleated RBC 0.00 /100 WBC    Neutrophils (Absolute) 9.95 (H) 2.00 - 7.15 K/uL    Lymphs (Absolute) 2.94 1.00 - 4.80 K/uL    Monos (Absolute) 1.30 (H) 0.00 - 0.85 K/uL    Eos (Absolute) 0.15 0.00 - 0.51 K/uL    Baso (Absolute) 0.07 0.00 - 0.12 K/uL    Immature Granulocytes  (abs) 0.11 0.00 - 0.11 K/uL    NRBC (Absolute) 0.00 K/uL   COMP METABOLIC PANEL   Result Value Ref Range    Sodium 136 135 - 145 mmol/L    Potassium 4.1 3.6 - 5.5 mmol/L    Chloride 99 96 - 112 mmol/L    Co2 22 20 - 33 mmol/L    Anion Gap 15.0 7.0 - 16.0    Glucose 116 (H) 65 - 99 mg/dL    Bun 10 8 - 22 mg/dL    Creatinine 0.61 0.50 - 1.40 mg/dL    Calcium 9.4 8.5 - 10.5 mg/dL    AST(SGOT) 107 (H) 12 - 45 U/L    ALT(SGPT) 93 (H) 2 - 50 U/L    Alkaline Phosphatase 233 (H) 30 - 99 U/L    Total Bilirubin 0.5 0.1 - 1.5 mg/dL    Albumin 3.8 3.2 - 4.9 g/dL    Total Protein 9.7 (H) 6.0 - 8.2 g/dL    Globulin 5.9 (H) 1.9 - 3.5 g/dL    A-G Ratio 0.6 g/dL   Group A Strep by PCR    Specimen: Throat   Result Value Ref Range    Group A Strep by PCR DETECTED (A) Not Detected   ESTIMATED GFR   Result Value Ref Range    GFR If African American >60 >60 mL/min/1.73 m 2    GFR If Non African American >60 >60 mL/min/1.73 m 2       12:37 PM patient was treated with pain medications and IV antibiotics.  Does have a developing 9 mm left peritonsillar abscess with tonsillitis.  Her strep is positive.  She will be admitted to [facility] internal medicine with a consult to Dr. Cj RENDON for possible drainage.  Patient understands that she will be admitted for pain control antibiotics and further care.      12:47 PM I spoke with [facility] internal medicine who will admit the patient    12:51 PM I spoke with Dr. Cj RENDON who the patient scan and states that she does not need a drainage at this time.  She will be admitted overnight to [facility] internal medicine for IV antibiotics and sent home on oral antibiotics.  Dr. Corona is happy to follow-up with this patient as an outpatient for possible tonsillectomy and also for the thyroid nodule that was diagnosed on the CT scan.      FINAL IMPRESSION  1. Strep pharyngitis    2. Peritonsillar abscess    3. Thyroid nodule             Electronically signed by: Christianne Stevenson M.D., 6/8/2020 10:42 AM

## 2020-06-08 NOTE — ED NOTES
Lab called with critical result of +Group A strep at 1219. Critical lab result read back to LAB.   Dr. Stevenson notified of critical lab result at 1220.  Critical lab result read back by  .

## 2020-06-08 NOTE — PROGRESS NOTES
Beside report received, pt care assumed. VSS, pt assessment complete. Pt aaox4, no signs of distress noted at this time. POC discussed with pt and verbalizes no questions. Pt denies any additional needs at this time. Bed in lowest position, pt educated on fall risk and verbalized understanding, call light within reach.

## 2020-06-09 LAB
ANION GAP SERPL CALC-SCNC: 11 MMOL/L (ref 7–16)
BASOPHILS # BLD AUTO: 0.9 % (ref 0–1.8)
BASOPHILS # BLD: 0.21 K/UL (ref 0–0.12)
BUN SERPL-MCNC: 14 MG/DL (ref 8–22)
CALCIUM SERPL-MCNC: 8.2 MG/DL (ref 8.5–10.5)
CHLORIDE SERPL-SCNC: 101 MMOL/L (ref 96–112)
CO2 SERPL-SCNC: 23 MMOL/L (ref 20–33)
CREAT SERPL-MCNC: 0.67 MG/DL (ref 0.5–1.4)
EOSINOPHIL # BLD AUTO: 0 K/UL (ref 0–0.51)
EOSINOPHIL NFR BLD: 0 % (ref 0–6.9)
ERYTHROCYTE [DISTWIDTH] IN BLOOD BY AUTOMATED COUNT: 47.5 FL (ref 35.9–50)
GLUCOSE SERPL-MCNC: 111 MG/DL (ref 65–99)
HCT VFR BLD AUTO: 45.7 % (ref 37–47)
HGB BLD-MCNC: 14.1 G/DL (ref 12–16)
LYMPHOCYTES # BLD AUTO: 4.95 K/UL (ref 1–4.8)
LYMPHOCYTES NFR BLD: 20.9 % (ref 22–41)
MANUAL DIFF BLD: NORMAL
MCH RBC QN AUTO: 27.9 PG (ref 27–33)
MCHC RBC AUTO-ENTMCNC: 30.9 G/DL (ref 33.6–35)
MCV RBC AUTO: 90.3 FL (ref 81.4–97.8)
MONOCYTES # BLD AUTO: 1.02 K/UL (ref 0–0.85)
MONOCYTES NFR BLD AUTO: 4.3 % (ref 0–13.4)
MORPHOLOGY BLD-IMP: NORMAL
NEUTROPHILS # BLD AUTO: 17.51 K/UL (ref 2–7.15)
NEUTROPHILS NFR BLD: 73.9 % (ref 44–72)
NRBC # BLD AUTO: 0 K/UL
NRBC BLD-RTO: 0 /100 WBC
PLATELET # BLD AUTO: 297 K/UL (ref 164–446)
PLATELET BLD QL SMEAR: NORMAL
PMV BLD AUTO: 10.6 FL (ref 9–12.9)
POTASSIUM SERPL-SCNC: 3.9 MMOL/L (ref 3.6–5.5)
RBC # BLD AUTO: 5.06 M/UL (ref 4.2–5.4)
RBC BLD AUTO: NORMAL
SODIUM SERPL-SCNC: 135 MMOL/L (ref 135–145)
WBC # BLD AUTO: 23.7 K/UL (ref 4.8–10.8)

## 2020-06-09 PROCEDURE — 700101 HCHG RX REV CODE 250: Performed by: STUDENT IN AN ORGANIZED HEALTH CARE EDUCATION/TRAINING PROGRAM

## 2020-06-09 PROCEDURE — 99232 SBSQ HOSP IP/OBS MODERATE 35: CPT | Mod: GC | Performed by: INTERNAL MEDICINE

## 2020-06-09 PROCEDURE — 80048 BASIC METABOLIC PNL TOTAL CA: CPT

## 2020-06-09 PROCEDURE — 96372 THER/PROPH/DIAG INJ SC/IM: CPT

## 2020-06-09 PROCEDURE — 85007 BL SMEAR W/DIFF WBC COUNT: CPT

## 2020-06-09 PROCEDURE — 700105 HCHG RX REV CODE 258: Performed by: STUDENT IN AN ORGANIZED HEALTH CARE EDUCATION/TRAINING PROGRAM

## 2020-06-09 PROCEDURE — A9270 NON-COVERED ITEM OR SERVICE: HCPCS | Performed by: STUDENT IN AN ORGANIZED HEALTH CARE EDUCATION/TRAINING PROGRAM

## 2020-06-09 PROCEDURE — 85027 COMPLETE CBC AUTOMATED: CPT

## 2020-06-09 PROCEDURE — 770021 HCHG ROOM/CARE - ISO PRIVATE

## 2020-06-09 PROCEDURE — 700111 HCHG RX REV CODE 636 W/ 250 OVERRIDE (IP): Performed by: STUDENT IN AN ORGANIZED HEALTH CARE EDUCATION/TRAINING PROGRAM

## 2020-06-09 PROCEDURE — 96366 THER/PROPH/DIAG IV INF ADDON: CPT

## 2020-06-09 PROCEDURE — 96376 TX/PRO/DX INJ SAME DRUG ADON: CPT

## 2020-06-09 PROCEDURE — 700102 HCHG RX REV CODE 250 W/ 637 OVERRIDE(OP): Performed by: STUDENT IN AN ORGANIZED HEALTH CARE EDUCATION/TRAINING PROGRAM

## 2020-06-09 RX ADMIN — SODIUM CHLORIDE 3 G: 900 INJECTION INTRAVENOUS at 18:17

## 2020-06-09 RX ADMIN — DOCUSATE SODIUM 50 MG AND SENNOSIDES 8.6 MG 2 TABLET: 8.6; 5 TABLET, FILM COATED ORAL at 18:17

## 2020-06-09 RX ADMIN — ALBUTEROL SULFATE 2 PUFF: 90 AEROSOL, METERED RESPIRATORY (INHALATION) at 20:06

## 2020-06-09 RX ADMIN — AMLODIPINE BESYLATE 10 MG: 10 TABLET ORAL at 06:31

## 2020-06-09 RX ADMIN — ENOXAPARIN SODIUM 40 MG: 40 INJECTION SUBCUTANEOUS at 06:36

## 2020-06-09 RX ADMIN — SODIUM CHLORIDE 3 G: 900 INJECTION INTRAVENOUS at 00:44

## 2020-06-09 RX ADMIN — SODIUM CHLORIDE 3 G: 900 INJECTION INTRAVENOUS at 06:28

## 2020-06-09 RX ADMIN — SODIUM CHLORIDE 3 G: 900 INJECTION INTRAVENOUS at 12:41

## 2020-06-09 RX ADMIN — ASPIRIN 81 MG 81 MG: 81 TABLET ORAL at 06:31

## 2020-06-09 RX ADMIN — MORPHINE SULFATE 2 MG: 4 INJECTION INTRAVENOUS at 06:30

## 2020-06-09 RX ADMIN — MORPHINE SULFATE 2 MG: 4 INJECTION INTRAVENOUS at 01:49

## 2020-06-09 RX ADMIN — MORPHINE SULFATE 2.5 MG: 10 SOLUTION ORAL at 19:56

## 2020-06-09 RX ADMIN — LISINOPRIL 2.5 MG: 2.5 TABLET ORAL at 06:35

## 2020-06-09 RX ADMIN — SODIUM CHLORIDE: 9 INJECTION, SOLUTION INTRAVENOUS at 06:28

## 2020-06-09 RX ADMIN — SODIUM CHLORIDE 3 G: 900 INJECTION INTRAVENOUS at 23:48

## 2020-06-09 RX ADMIN — MORPHINE SULFATE 2 MG: 4 INJECTION INTRAVENOUS at 12:42

## 2020-06-09 ASSESSMENT — ENCOUNTER SYMPTOMS
NAUSEA: 0
FOCAL WEAKNESS: 0
BLURRED VISION: 0
DOUBLE VISION: 0
COUGH: 0
HEADACHES: 0
STRIDOR: 0
MYALGIAS: 0
ABDOMINAL PAIN: 0
HEMOPTYSIS: 0
PALPITATIONS: 0
NECK PAIN: 0
FEVER: 0
SHORTNESS OF BREATH: 0
EYE PAIN: 0
DEPRESSION: 0
CHILLS: 0
SENSORY CHANGE: 0
HEARTBURN: 0
SORE THROAT: 1
VOMITING: 0
WHEEZING: 0
DIZZINESS: 0

## 2020-06-09 ASSESSMENT — PATIENT HEALTH QUESTIONNAIRE - PHQ9
1. LITTLE INTEREST OR PLEASURE IN DOING THINGS: NOT AT ALL
1. LITTLE INTEREST OR PLEASURE IN DOING THINGS: NOT AT ALL
2. FEELING DOWN, DEPRESSED, IRRITABLE, OR HOPELESS: NOT AT ALL
2. FEELING DOWN, DEPRESSED, IRRITABLE, OR HOPELESS: NOT AT ALL
SUM OF ALL RESPONSES TO PHQ9 QUESTIONS 1 AND 2: 0
SUM OF ALL RESPONSES TO PHQ9 QUESTIONS 1 AND 2: 0

## 2020-06-09 NOTE — PROGRESS NOTES
Report received from TOOTIE Oliveros.  Patient sitting up in bed watching TV.  POC gone over with pt and all questions answered.  Patient A & O  X 4.  No apparent signs of distress.  Safety precautions in place.  Patient educated to call for assistance.  Will continue to monitor.

## 2020-06-09 NOTE — CARE PLAN
Problem: Pain Management  Goal: Pain level will decrease to patient's comfort goal  Outcome: PROGRESSING AS EXPECTED     Problem: Knowledge Deficit  Goal: Knowledge of disease process/condition, treatment plan, diagnostic tests, and medications will improve  Outcome: PROGRESSING AS EXPECTED

## 2020-06-09 NOTE — H&P
History & Physical Note    Date of Admission: 6/8/2020  Admission Status: Observation-Outpatient  [facility] Team: [facility]  Attending: Dr Ravi,   Senior Resident: Dr. Rand  Intern: Dr. Vogt  Contact Number: [phone]    Chief Complaint: Left-sided neck and throat pain    History of Present Illness (HPI): Lizzy is a 64 y.o. female who presented 6/8/2020 with above-mentioned complaints.  Patient was apparently doing well at her baseline health until she developed sinus infection, bilateral ear pain and throat pain around 4 days ago.  Her symptoms gradually worsened over the days.  No fever/chills.  Patient is able to swallow, but with difficulty and pain.  No difficulty breathing reported.  Patient did not seek medical help, nor was on any medication for the above-mentioned complaints.   Denies headache, chest pain, shortness of breath, abdominal pain, nausea/vomiting  Past medical history remarkable for hypertension, CVA 2 years ago    In ER, her blood pressure was elevated at 170/91, patient tachycardic with heart rate of 105, afebrile, respiratory 20, saturating mid 90s with 2 L nasal cannula.  Labs were remarkable for leukocytosis of 14.5.   CT soft tissue neck remarkable for left tonsillitis with possible developing peritonsillar abscess of 9 mm.  ENT was consulted from ER, no acute drainage recommended.  Patient admitted to medical floor for IV antibiotic therapy    Review of Systems:   Review of Systems   Constitutional: Negative for chills and fever.   HENT: Positive for sore throat. Negative for ear discharge.    Eyes: Negative for photophobia.   Respiratory: Negative for cough, shortness of breath, wheezing and stridor.    Cardiovascular: Negative for chest pain, palpitations and orthopnea.   Gastrointestinal: Negative for abdominal pain, diarrhea, nausea and vomiting.   Genitourinary: Negative for dysuria.   Musculoskeletal: Positive for neck pain. Negative for myalgias.        Left-sided  neck pain   Skin: Negative for rash.   Neurological: Negative for focal weakness and headaches.   Endo/Heme/Allergies: Negative for environmental allergies.   Psychiatric/Behavioral: Negative for depression.       Past Medical History:   Past Medical History was reviewed with patient.   has a past medical history of Hypertension and Stroke (HCC).    Past Surgical History: Past Surgical History was reviewed with patient.   has no past surgical history on file.    Medications: Medications have been reviewed with patient.  Prior to Admission Medications   Prescriptions Last Dose Informant Patient Reported? Taking?   albuterol 108 (90 Base) MCG/ACT Aero Soln inhalation aerosol UNK at UNK  No No   Sig: Inhale 1-2 Puffs by mouth every four hours as needed (shortness of breath and/or wheezing).   amLODIPine (NORVASC) 10 MG Tab 6/7/2020 at 1800  Yes Yes   Sig: Take 10 mg by mouth every day.   aspirin (ASA) 81 MG Chew Tab chewable tablet 6/7/2020 at 1800  Yes Yes   Sig: Take 81 mg by mouth every day.   lisinopril (PRINIVIL) 5 MG Tab 6/7/2020 at 1800  Yes Yes   Sig: Take 2.5 mg by mouth every day.   loratadine (CLARITIN) 10 MG Tab 6/7/2020 at 1800  Yes Yes   Sig: Take 10 mg by mouth every day.      Facility-Administered Medications: None        Allergies: Allergies have been reviewed with patient.  No Known Allergies    Family History: History of hypertension for mother  family history is not on file.     Social History:   Tobacco: Smokes 1 packet over a week for past several years  Alcohol: No alcohol use  Recreational drugs (illegal and prescription): No drug use  Employment: Currently unemployed  Activity Level: Able to do daily activities by herself  Living situation: Lives alone  Recent travel: None reported  Primary Care Provider: not reviewed Pcp Pt States None  Other (stressors, spirituality, exposures): None  Physical Exam:   Vitals:  Temp:  [36.3 °C (97.3 °F)-37.6 °C (99.6 °F)] 36.3 °C (97.3 °F)  Pulse:  []  84  Resp:  [14-25] 20  BP: (120-172)/(64-91) 120/69  SpO2:  [92 %-96 %] 92 %    Physical Exam  Constitutional:       General: She is in acute distress.      Comments: In acute distress due to pain   HENT:      Head: Normocephalic.      Nose: Nose normal.      Mouth/Throat:      Comments: Oral cavity not fully visible as mouth opening limited due to pain.   Eyes:      Extraocular Movements: Extraocular movements intact.   Neck:      Musculoskeletal: Muscular tenderness present.      Comments: Left-sided neck tenderness with mild diffuse swelling  Cardiovascular:      Rate and Rhythm: Normal rate and regular rhythm.   Pulmonary:      Effort: Pulmonary effort is normal. No respiratory distress.      Breath sounds: No wheezing.   Abdominal:      General: There is no distension.      Palpations: Abdomen is soft.      Tenderness: There is no abdominal tenderness.   Musculoskeletal: Normal range of motion.         General: No deformity.   Skin:     General: Skin is warm.   Neurological:      Mental Status: She is alert and oriented to person, place, and time.      Comments: No gross focal deficits   Psychiatric:         Mood and Affect: Mood normal.         Labs:   Recent Labs     06/08/20  1100   WBC 14.5*   RBC 6.19*   HEMOGLOBIN 17.5*   HEMATOCRIT 55.6*   MCV 89.8   MCH 28.3   RDW 46.8   PLATELETCT 320   MPV 10.3   NEUTSPOLYS 68.50   LYMPHOCYTES 20.20*   MONOCYTES 9.00   EOSINOPHILS 1.00   BASOPHILS 0.50     Recent Labs     06/08/20  1100   SODIUM 136   POTASSIUM 4.1   CHLORIDE 99   CO2 22   GLUCOSE 116*   BUN 10     Recent Labs     06/08/20  1100   ALBUMIN 3.8   TBILIRUBIN 0.5   ALKPHOSPHAT 233*   TOTPROTEIN 9.7*   ALTSGPT 93*   ASTSGOT 107*   CREATININE 0.61        EKG: Per my read, sinus rhythm, no acute ischemic changes    Imaging:   CT-SOFT TISSUE NECK WITH   Final Result      1.  Left tonsillitis with possible developing 9 mm in diameter peritonsillar abscess.      2.  No airway compromise.      3.  Reactive  appearing adenopathy in the left anterior cervical lymph node chain.      4.  12 mm diameter low-attenuation area in the lower pole the left lobe of the thyroid gland which may represent thyroid cyst or solid nodule.           Previous Data Review: reviewed    Evolving left peritonsillar abscess determined by imaging  Assessment & Plan  Patient admitted with left sided neck pain and throat pain for 4 days duration.  Labs remarkable for leukocytosis.  CT soft tissue neck remarkable for possible developing left peritonsillar abscess of 9 mm.  ENT consulted from ER, no acute drainage recommended  -Admit to medicine  -Started on IV Unasyn  -Analgesics as needed  -Liquid diet, advance as tolerated  -monitor labs, follow blood c/s  -Continue to monitor    Hypertension  Assessment & Plan  Known history of hypertension.  On amlodipine and lisinopril at home  -Continue home meds  -PRN antihypertensives  -Adequate pain control       Please note that this dictation was created using voice recognition software. I have made every reasonable attempt to correct obvious errors, but there may be errors of grammar and possibly content that I did not discover before finalizing the note.

## 2020-06-09 NOTE — CARE PLAN
Problem: Safety  Goal: Will remain free from falls  Outcome: PROGRESSING AS EXPECTED  Note: Pt educated on safety precautions, utilization of the call light, and bed alarm.  Pt verbalized understanding.      Problem: Pain Management  Goal: Pain level will decrease to patient's comfort goal  Outcome: PROGRESSING AS EXPECTED     Problem: Infection  Goal: Will remain free from infection  Outcome: NOT MET  Note: Implement standard precautions and perform handwashing before and after patient contact. RN will follow protocols and necessary steps to minimize the spread of infection.  RN educated pt and any visitors on proper hand hygiene.      Problem: Knowledge Deficit  Goal: Knowledge of disease process/condition, treatment plan, diagnostic tests, and medications will improve  Outcome: PROGRESSING AS EXPECTED

## 2020-06-09 NOTE — PROGRESS NOTES
Daily Progress Note:     Date of Service: 6/9/2020  Primary Team: [facility] Blue Team   Attending: Chirag Ravi M.D.   Senior Resident: Dr. Rand  Intern: Dr. Vogt  Contact:  [phone]    Chief Complaint:   Sore throat    Subjective:   Ms. Curtis 64-year-old female admitted on 6/8/2024 streptococcal pharyngitis complicated with left peritonsillar abscess and was started on IV Unasyn    Interval update  -No acute events overnight  -Patient is feeling much better.  Sore throat has improved  -Worsening leukocytosis  -Vital signs stable.  Blood cultures negative to date.    Consultants/Specialty:  ENT    Review of Systems:    Review of Systems   Constitutional: Negative for chills, fever and malaise/fatigue.   HENT: Positive for sore throat. Negative for congestion.    Eyes: Negative for blurred vision and double vision.   Respiratory: Negative for cough, hemoptysis and stridor.    Cardiovascular: Negative for chest pain and palpitations.   Gastrointestinal: Negative for abdominal pain, heartburn, nausea and vomiting.   Genitourinary: Negative for dysuria and urgency.   Musculoskeletal: Negative for myalgias and neck pain.   Neurological: Negative for dizziness, focal weakness and headaches.   Psychiatric/Behavioral: Negative for depression and suicidal ideas.     Objective Data:   Physical Exam:   Vitals:   Temp:  [36.2 °C (97.1 °F)-37.4 °C (99.4 °F)] 36.6 °C (97.9 °F)  Pulse:  [] 75  Resp:  [16-20] 16  BP: (100-153)/(52-81) 121/72  SpO2:  [90 %-96 %] 91 %    Physical Exam  Vitals signs and nursing note reviewed.   HENT:      Head: Normocephalic and atraumatic.      Nose: No congestion.      Mouth/Throat:      Mouth: Mucous membranes are moist.      Pharynx: Posterior oropharyngeal erythema present.      Tonsils: Tonsillar abscess present.      Comments: Swelling on the left tonsil, improving  Eyes:      Extraocular Movements: Extraocular movements intact.      Pupils: Pupils are equal, round, and  reactive to light.   Neck:      Musculoskeletal: Normal range of motion.   Cardiovascular:      Rate and Rhythm: Normal rate and regular rhythm.      Heart sounds: No murmur.   Pulmonary:      Effort: Pulmonary effort is normal. No respiratory distress.      Breath sounds: Normal breath sounds.   Abdominal:      General: Bowel sounds are normal. There is no distension.      Palpations: Abdomen is soft. There is no mass.   Musculoskeletal:         General: No swelling.   Neurological:      Mental Status: She is alert.       Labs:   -See results    Imaging:   CT-SOFT TISSUE NECK WITH   Final Result      1.  Left tonsillitis with possible developing 9 mm in diameter peritonsillar abscess.      2.  No airway compromise.      3.  Reactive appearing adenopathy in the left anterior cervical lymph node chain.      4.  12 mm diameter low-attenuation area in the lower pole the left lobe of the thyroid gland which may represent thyroid cyst or solid nodule.        Assessment and plan  #Left tonsillitis with developing peritonsillar abscess  #Group A strep pharyngitis  #2/3 SIRS Criteria (leukocytosis and tachycardia)  #Thyroid gland nodule/cyst -outpatient follow-up with ENT  -History of sore throat for the past 4 days  -Positive for group A strep  - CT showed left tonsillitis with possible developing peritonsillar abscess, reactive appearing adenopathy in the left anterior cervical lymph node chain.   - ENT was consulted, recommended against any drainage at this time and continue with IV antibiotics and to follow-up with ENT as outpatient  - Patient was given IV Unasyn and IV fluids.      Plan:  -Continue IV Unasyn  -d/c IV fluids  -Pain control  -Regular diet  -Droplet precautions  -Thyroid gland nodule/cyst - outpatient follow-up with ENT     Core Measures  Code status: Full  DVT prophylaxis: Lovenox  GI prophylaxis: None  Antibiotics: Unasyn

## 2020-06-09 NOTE — ASSESSMENT & PLAN NOTE
Known history of hypertension.  On amlodipine and lisinopril at home  -Continue home meds  -PRN antihypertensives  -Adequate pain control

## 2020-06-09 NOTE — DISCHARGE PLANNING
Care Transition Team Assessment    Attempted to speak with patient via phone but no answer. Has Medicaid FFS insurance. No PCP listed. Lives [place]. Unknown needs @ present time.    Information Source  Information Given By: Other (Comments)(Chart reviewed.)    Readmission Evaluation  Is this a readmission?: No    Interdisciplinary Discharge Planning  Primary Care Physician: None listed  Patient or legal guardian wants to designate a caregiver (see row info): No  Support Systems: Children  Housing / Facility: 1 Story Apartment / Condo  Do You Take your Prescribed Medications Regularly: Yes  Able to Return to Previous ADL's: Yes  Mobility Issues: No  Prior Services: None  Patient Expects to be Discharged to:: Home  Assistance Needed: Unknown at this Time  Durable Medical Equipment: Not Applicable    Discharge Preparedness  Prior Functional Level: Ambulatory    Functional Assesment  Prior Functional Level: Ambulatory    Finances  Prescription Coverage: Yes    Anticipated Discharge Information  Anticipated discharge disposition: Home  Discharge Address: [address] [place] [address]  Discharge Contact Phone Number: [phone]

## 2020-06-09 NOTE — PROGRESS NOTES
"Assumed pt care at 0700. Received report from Maddi SOMMERS. Pt A&O x4. Pt c/o no pain, 0/10 pain at this time and states she \"feels so much better than yesterday\". Respirations are even and unlabored on 2L O2.    Updated on POC including IV antibiotics, safety, labs and pain control. Communication board updated. Bed locked and in lowest position. Call light and belongings within reach. Non-skid socks in place. Needs met, will continue to monitor.   "

## 2020-06-09 NOTE — ASSESSMENT & PLAN NOTE
Patient admitted with left sided neck pain and throat pain for 4 days duration.  Labs remarkable for leukocytosis.  CT soft tissue neck remarkable for possible developing left peritonsillar abscess of 9 mm.  ENT consulted from ER, no acute drainage recommended  -Admit to medicine  -Started on IV Unasyn  -Analgesics as needed  -Liquid diet, advance as tolerated  -monitor labs, follow blood c/s  -Continue to monitor

## 2020-06-09 NOTE — NON-PROVIDER
Daily Progress Note:     Date of Service: 6/9/2020  Primary Team: [facility]   Attending: Chirag Ravi M.D.   Senior Resident: Dr. Rand  Intern: Dr. Margarita Morris  Contact:  [phone]    Chief Complaint:   Left sided neck and throat pain    Subjective: Lizzy is a 64 y.o. female who presented 6/8/2020 with gradually worsening sore throat for 4 days. Group A strep test was positive. Peritonsillar abscess per CT.     Patient is still complaining of pain to left neck but it is improved from yesterday. She is able to eat solid foods. No more voice changes. No difficulty breathing, fevers, chills.       Consultants/Specialty:  Dr. Mason/ ENT    Review of Systems:    Review of Systems   HENT: Positive for sore throat. Negative for ear pain.    Eyes: Negative for blurred vision, double vision and pain.   Respiratory: Negative for shortness of breath, wheezing and stridor.    Cardiovascular: Negative for chest pain.   Gastrointestinal: Negative for abdominal pain, nausea and vomiting.   Genitourinary: Negative for dysuria, frequency and urgency.   Musculoskeletal: Negative for myalgias.   Skin: Negative for rash.   Neurological: Negative for dizziness, sensory change and headaches.       Objective Data:   Physical Exam:   Vitals:   Temp:  [36.2 °C (97.1 °F)-37.6 °C (99.6 °F)] 36.7 °C (98 °F)  Pulse:  [] 78  Resp:  [14-25] 16  BP: (100-172)/(52-91) 130/62  SpO2:  [90 %-96 %] 96 %    Physical Exam  Eyes:      Conjunctiva/sclera: Conjunctivae normal.   Neck:      Musculoskeletal: Normal range of motion and neck supple. Muscular tenderness present. No neck rigidity.   Cardiovascular:      Rate and Rhythm: Regular rhythm. Tachycardia present.   Pulmonary:      Effort: Pulmonary effort is normal. No respiratory distress.      Breath sounds: Normal breath sounds. No stridor.   Abdominal:      General: Abdomen is flat. Bowel sounds are normal.      Palpations: Abdomen is soft.   Musculoskeletal: Normal  range of motion.   Skin:     General: Skin is warm and dry.   Neurological:      General: No focal deficit present.      Mental Status: She is alert and oriented to person, place, and time.   Psychiatric:         Mood and Affect: Mood normal.         Behavior: Behavior normal.           Labs:   Results for OK NARVAEZ (MRN [id number]) as of 6/9/2020 06:30   Ref. Range 6/9/2020 00:50   WBC Latest Ref Range: 4.8 - 10.8 K/uL 23.7 (H)   RBC Latest Ref Range: 4.20 - 5.40 M/uL 5.06   Hemoglobin Latest Ref Range: 12.0 - 16.0 g/dL 14.1   Hematocrit Latest Ref Range: 37.0 - 47.0 % 45.7   MCV Latest Ref Range: 81.4 - 97.8 fL 90.3   MCH Latest Ref Range: 27.0 - 33.0 pg 27.9   MCHC Latest Ref Range: 33.6 - 35.0 g/dL 30.9 (L)   RDW Latest Ref Range: 35.9 - 50.0 fL 47.5   Platelet Count Latest Ref Range: 164 - 446 K/uL 297   MPV Latest Ref Range: 9.0 - 12.9 fL 10.6   Neutrophils-Polys Latest Ref Range: 44.00 - 72.00 % 73.90 (H)   Neutrophils (Absolute) Latest Ref Range: 2.00 - 7.15 K/uL 17.51 (H)   Lymphocytes Latest Ref Range: 22.00 - 41.00 % 20.90 (L)   Lymphs (Absolute) Latest Ref Range: 1.00 - 4.80 K/uL 4.95 (H)   Monocytes Latest Ref Range: 0.00 - 13.40 % 4.30   Monos (Absolute) Latest Ref Range: 0.00 - 0.85 K/uL 1.02 (H)   Eosinophils Latest Ref Range: 0.00 - 6.90 % 0.00   Eos (Absolute) Latest Ref Range: 0.00 - 0.51 K/uL 0.00   Basophils Latest Ref Range: 0.00 - 1.80 % 0.90   Baso (Absolute) Latest Ref Range: 0.00 - 0.12 K/uL 0.21 (H)   Nucleated RBC Latest Units: /100 WBC 0.00   NRBC (Absolute) Latest Units: K/uL 0.00   Plt Estimation Unknown Normal   RBC Morphology Unknown Normal   Peripheral Smear Review Unknown see below   Manual Diff Status Unknown PERFORMED   Sodium Latest Ref Range: 135 - 145 mmol/L 135   Potassium Latest Ref Range: 3.6 - 5.5 mmol/L 3.9   Chloride Latest Ref Range: 96 - 112 mmol/L 101   Co2 Latest Ref Range: 20 - 33 mmol/L 23   Anion Gap Latest Ref Range: 7.0 - 16.0  11.0   Glucose Latest Ref Range:  65 - 99 mg/dL 111 (H)   Bun Latest Ref Range: 8 - 22 mg/dL 14   Creatinine Latest Ref Range: 0.50 - 1.40 mg/dL 0.67   GFR If  Latest Ref Range: >60 mL/min/1.73 m 2 >60   GFR If Non  Latest Ref Range: >60 mL/min/1.73 m 2 >60   Calcium Latest Ref Range: 8.5 - 10.5 mg/dL 8.2 (L)       Imaging:   No new imaging    Assessment & Plan:  Peritonsillar abscess:  64 year old female with 5 days of gradually progressive throat pain and swelling.  CT neck: 9mm left sided peritonsillar abscess  WBC count: 23.7  Group A Strep: Positive    Continue IV ampicillin/sulbactam for 7 days course  Morphine Q4HRS for pain  Discontinue NS  Regular diet  Follow blood culture       HTN:  Chronic hypertension on home medication.      Continue Lisinopril 2.5 Q Day and Amlodipine 10mg Q Day      Thyroid nodule:  Follow up with ENT outpatient

## 2020-06-09 NOTE — SENIOR ADMIT NOTE
Senior admit note    HPI:  Ms. Curtis, 64-year-old female with past medical history of hypertension and has history of tobacco use for many years who presented to the ED with complaint of sore throat for the past 4 days associated with swelling on the left side of the neck and excessive pain with swallowing.  She denies any fever, chills, shortness of breath, cough, nausea or vomiting, she denies any sick contacts    In the ED, she appeared ill and in pain especially when swallowing.  Vital signs, tachycardic with heart rate in 100s, SPO2 92% on 2 L nasal cannula, hypotensive, afebrile.  Labs showed leukocytosis of 14.5, Hgb 17.5, , K4.1, CL 99, blood glucose 116, , ALT 93, alkaline phosphatase 233, group A strep was positive, CT showed left tonsillitis with possible developing peritonsillar abscess, reactive appearing adenopathy in the left anterior cervical lymph node chain.  Patient was given IV Unasyn and IV fluids.  ENT was consulted, recommended against any drainage at this time and continue with IV antibiotics and to follow-up with ENT as outpatient    Physical examination:   Physical Exam   Constitutional: She is oriented to person, place, and time and well-developed, well-nourished, and in no distress.   HENT:   Head: Normocephalic and atraumatic.   Mouth/Throat: Mucous membranes are dry.   Swollen erythmatous oropharynx   Eyes: Pupils are equal, round, and reactive to light. EOM are normal.   Neck: Normal range of motion. Neck supple. No thyroid mass and no thyromegaly present.   Swelling and tenderness on the left neck   Cardiovascular: Normal rate, regular rhythm and normal heart sounds.   Pulmonary/Chest: Effort normal and breath sounds normal. No stridor. No respiratory distress. She has no wheezes.   Abdominal: Soft. Bowel sounds are normal. She exhibits no distension. There is no abdominal tenderness.   Musculoskeletal: Normal range of motion.         General: No edema.   Neurological:  She is alert and oriented to person, place, and time.   Skin: Skin is warm and dry.   Psychiatric: Mood and affect normal.     Assessment:  #Left tonsillitis with developing peritonsillar abscess  #Group A strep pharyngitis  #2/3 SIRS Criteria (leukocytosis and tachycardia)  #Thyroid gland nodule/cyst -outpatient follow-up with ENT  -History of sore throat for the past 4 days  -Positive for group A strep  - CT showed left tonsillitis with possible developing peritonsillar abscess, reactive appearing adenopathy in the left anterior cervical lymph node chain.   - ENT was consulted, recommended against any drainage at this time and continue with IV antibiotics and to follow-up with ENT as outpatient  - Patient was given IV Unasyn and IV fluids.     Plan:  -Admit medical floor  -Obtain blood cultures  -Continue IV Unasyn  -IV fluids  -Pain control  -Clear liquid diet and advance as tolerated  -Droplet precautions  -Thyroid gland nodule/cyst - outpatient follow-up with ENT    Core Measures  Code status: Full  DVT prophylaxis: Lovenox  GI prophylaxis: None  Antibiotics: Unasyn    For Complete H&P, Please see note by Dr. Vogt

## 2020-06-10 ENCOUNTER — PATIENT OUTREACH (OUTPATIENT)
Dept: HEALTH INFORMATION MANAGEMENT | Facility: OTHER | Age: 65
End: 2020-06-10

## 2020-06-10 VITALS
BODY MASS INDEX: 40.2 KG/M2 | DIASTOLIC BLOOD PRESSURE: 82 MMHG | RESPIRATION RATE: 18 BRPM | HEART RATE: 81 BPM | SYSTOLIC BLOOD PRESSURE: 156 MMHG | OXYGEN SATURATION: 93 % | HEIGHT: 62 IN | TEMPERATURE: 99.2 F | WEIGHT: 218.48 LBS

## 2020-06-10 LAB
ANION GAP SERPL CALC-SCNC: 6 MMOL/L (ref 7–16)
BASOPHILS # BLD AUTO: 0.7 % (ref 0–1.8)
BASOPHILS # BLD: 0.08 K/UL (ref 0–0.12)
BUN SERPL-MCNC: 10 MG/DL (ref 8–22)
CALCIUM SERPL-MCNC: 9 MG/DL (ref 8.5–10.5)
CHLORIDE SERPL-SCNC: 106 MMOL/L (ref 96–112)
CO2 SERPL-SCNC: 25 MMOL/L (ref 20–33)
CREAT SERPL-MCNC: 0.55 MG/DL (ref 0.5–1.4)
EOSINOPHIL # BLD AUTO: 0.23 K/UL (ref 0–0.51)
EOSINOPHIL NFR BLD: 1.9 % (ref 0–6.9)
ERYTHROCYTE [DISTWIDTH] IN BLOOD BY AUTOMATED COUNT: 47.4 FL (ref 35.9–50)
GLUCOSE SERPL-MCNC: 107 MG/DL (ref 65–99)
HCT VFR BLD AUTO: 48 % (ref 37–47)
HGB BLD-MCNC: 14.7 G/DL (ref 12–16)
IMM GRANULOCYTES # BLD AUTO: 0.08 K/UL (ref 0–0.11)
IMM GRANULOCYTES NFR BLD AUTO: 0.7 % (ref 0–0.9)
LYMPHOCYTES # BLD AUTO: 3.84 K/UL (ref 1–4.8)
LYMPHOCYTES NFR BLD: 31.7 % (ref 22–41)
MCH RBC QN AUTO: 27.7 PG (ref 27–33)
MCHC RBC AUTO-ENTMCNC: 30.6 G/DL (ref 33.6–35)
MCV RBC AUTO: 90.6 FL (ref 81.4–97.8)
MONOCYTES # BLD AUTO: 1.31 K/UL (ref 0–0.85)
MONOCYTES NFR BLD AUTO: 10.8 % (ref 0–13.4)
NEUTROPHILS # BLD AUTO: 6.57 K/UL (ref 2–7.15)
NEUTROPHILS NFR BLD: 54.2 % (ref 44–72)
NRBC # BLD AUTO: 0 K/UL
NRBC BLD-RTO: 0 /100 WBC
PLATELET # BLD AUTO: 267 K/UL (ref 164–446)
PMV BLD AUTO: 10.7 FL (ref 9–12.9)
POTASSIUM SERPL-SCNC: 4.9 MMOL/L (ref 3.6–5.5)
RBC # BLD AUTO: 5.3 M/UL (ref 4.2–5.4)
SODIUM SERPL-SCNC: 137 MMOL/L (ref 135–145)
WBC # BLD AUTO: 12.1 K/UL (ref 4.8–10.8)

## 2020-06-10 PROCEDURE — 700105 HCHG RX REV CODE 258: Performed by: STUDENT IN AN ORGANIZED HEALTH CARE EDUCATION/TRAINING PROGRAM

## 2020-06-10 PROCEDURE — A9270 NON-COVERED ITEM OR SERVICE: HCPCS | Performed by: STUDENT IN AN ORGANIZED HEALTH CARE EDUCATION/TRAINING PROGRAM

## 2020-06-10 PROCEDURE — 700102 HCHG RX REV CODE 250 W/ 637 OVERRIDE(OP): Performed by: STUDENT IN AN ORGANIZED HEALTH CARE EDUCATION/TRAINING PROGRAM

## 2020-06-10 PROCEDURE — 85025 COMPLETE CBC W/AUTO DIFF WBC: CPT

## 2020-06-10 PROCEDURE — 700111 HCHG RX REV CODE 636 W/ 250 OVERRIDE (IP): Performed by: STUDENT IN AN ORGANIZED HEALTH CARE EDUCATION/TRAINING PROGRAM

## 2020-06-10 PROCEDURE — 80048 BASIC METABOLIC PNL TOTAL CA: CPT

## 2020-06-10 PROCEDURE — 99238 HOSP IP/OBS DSCHRG MGMT 30/<: CPT | Mod: GC | Performed by: INTERNAL MEDICINE

## 2020-06-10 PROCEDURE — 700101 HCHG RX REV CODE 250: Performed by: STUDENT IN AN ORGANIZED HEALTH CARE EDUCATION/TRAINING PROGRAM

## 2020-06-10 RX ORDER — AMOXICILLIN AND CLAVULANATE POTASSIUM 875; 125 MG/1; MG/1
1 TABLET, FILM COATED ORAL 2 TIMES DAILY
Qty: 10 TAB | Refills: 0 | Status: SHIPPED | OUTPATIENT
Start: 2020-06-10 | End: 2020-06-15

## 2020-06-10 RX ORDER — LISINOPRIL 2.5 MG/1
2.5 TABLET ORAL DAILY
Status: DISCONTINUED | OUTPATIENT
Start: 2020-06-11 | End: 2020-06-10 | Stop reason: HOSPADM

## 2020-06-10 RX ORDER — IBUPROFEN 400 MG/1
400 TABLET ORAL EVERY 6 HOURS PRN
Qty: 12 TAB | Refills: 0 | Status: SHIPPED | OUTPATIENT
Start: 2020-06-10 | End: 2020-06-13

## 2020-06-10 RX ADMIN — LISINOPRIL 2.5 MG: 2.5 TABLET ORAL at 05:35

## 2020-06-10 RX ADMIN — ENOXAPARIN SODIUM 40 MG: 40 INJECTION SUBCUTANEOUS at 05:35

## 2020-06-10 RX ADMIN — AMLODIPINE BESYLATE 10 MG: 10 TABLET ORAL at 05:35

## 2020-06-10 RX ADMIN — MORPHINE SULFATE 2.5 MG: 10 SOLUTION ORAL at 08:47

## 2020-06-10 RX ADMIN — SODIUM CHLORIDE 3 G: 900 INJECTION INTRAVENOUS at 05:35

## 2020-06-10 ASSESSMENT — ENCOUNTER SYMPTOMS
SORE THROAT: 1
DEPRESSION: 0
DOUBLE VISION: 0
NAUSEA: 0
SORE THROAT: 0
STRIDOR: 0
MYALGIAS: 0
VOMITING: 0
HEADACHES: 0
NECK PAIN: 0
COUGH: 0
HEMOPTYSIS: 0
PALPITATIONS: 0
SENSORY CHANGE: 0
PSYCHIATRIC NEGATIVE: 1
CONSTIPATION: 1
BLURRED VISION: 0
HEARTBURN: 0
FEVER: 0
DIZZINESS: 0
WHEEZING: 0
FOCAL WEAKNESS: 0
SHORTNESS OF BREATH: 0
ABDOMINAL PAIN: 0
CHILLS: 0

## 2020-06-10 ASSESSMENT — PATIENT HEALTH QUESTIONNAIRE - PHQ9
SUM OF ALL RESPONSES TO PHQ9 QUESTIONS 1 AND 2: 0
1. LITTLE INTEREST OR PLEASURE IN DOING THINGS: NOT AT ALL
2. FEELING DOWN, DEPRESSED, IRRITABLE, OR HOPELESS: NOT AT ALL

## 2020-06-10 NOTE — PROGRESS NOTES
Daily Progress Note:     Date of Service: 6/10/2020  Primary Team: [facility] Blue Team   Attending: Chirag Ravi M.D.   Senior Resident: Dr. Rand  Intern: Dr. Vogt  Contact:  [phone]    Chief Complaint:   Sore throat    Subjective:   Ms. Curtis 64-year-old female admitted on 6/8/2024 streptococcal pharyngitis complicated with left peritonsillar abscess and was started on IV Unasyn    Interval update  -No acute events overnight  -Patient reports resolution of pain and difficulty swallowing  -Labs improved  -Vital signs stable.  Blood cultures negative to date.  -Okay to discharge with oral antibiotics    Consultants/Specialty:  ENT    Review of Systems:    Review of Systems   Constitutional: Negative for chills, fever and malaise/fatigue.   HENT: Negative for congestion and sore throat.    Eyes: Negative for blurred vision and double vision.   Respiratory: Negative for cough, hemoptysis and stridor.    Cardiovascular: Negative for chest pain and palpitations.   Gastrointestinal: Negative for abdominal pain, heartburn, nausea and vomiting.   Genitourinary: Negative for dysuria and urgency.   Musculoskeletal: Negative for myalgias and neck pain.   Neurological: Negative for dizziness, focal weakness and headaches.   Psychiatric/Behavioral: Negative for depression and suicidal ideas.     Objective Data:   Physical Exam:   Vitals:   Temp:  [36.6 °C (97.9 °F)-37.5 °C (99.5 °F)] 37.3 °C (99.2 °F)  Pulse:  [70-94] 81  Resp:  [16-18] 18  BP: (121-159)/(63-84) 156/82  SpO2:  [90 %-97 %] 93 %    Physical Exam  Vitals signs and nursing note reviewed.   HENT:      Head: Normocephalic and atraumatic.      Nose: No congestion.      Mouth/Throat:      Mouth: Mucous membranes are moist.      Pharynx: Posterior oropharyngeal erythema present.      Tonsils: Tonsillar abscess present.      Comments: Swelling on the left tonsil, improved significantly  Posterior pharyngeal erythema improving  Eyes:      Extraocular  Movements: Extraocular movements intact.      Pupils: Pupils are equal, round, and reactive to light.   Neck:      Musculoskeletal: Normal range of motion.   Cardiovascular:      Rate and Rhythm: Normal rate and regular rhythm.      Heart sounds: No murmur.   Pulmonary:      Effort: Pulmonary effort is normal. No respiratory distress.      Breath sounds: Normal breath sounds.   Abdominal:      General: Bowel sounds are normal. There is no distension.      Palpations: Abdomen is soft. There is no mass.   Musculoskeletal:         General: No swelling.   Neurological:      Mental Status: She is alert.       Labs:   -See results    Imaging:   CT-SOFT TISSUE NECK WITH   Final Result      1.  Left tonsillitis with possible developing 9 mm in diameter peritonsillar abscess.      2.  No airway compromise.      3.  Reactive appearing adenopathy in the left anterior cervical lymph node chain.      4.  12 mm diameter low-attenuation area in the lower pole the left lobe of the thyroid gland which may represent thyroid cyst or solid nodule.        Assessment and plan  #Left tonsillitis with developing peritonsillar abscess  #Group A strep pharyngitis  #2/3 SIRS Criteria (leukocytosis and tachycardia)  #Thyroid gland nodule/cyst -outpatient follow-up with ENT  - admitted with history of sore throat for 4 days  -Positive for group A strep  - CT showed left tonsillitis with possible developing peritonsillar abscess, reactive appearing adenopathy in the left anterior cervical lymph node chain.   - ENT was consulted, recommended against any drainage at this time and continue with IV antibiotics and to follow-up with ENT as outpatient  - Patient was given IV Unasyn and IV fluids.   - symptoms improved significantly with antibiotics, okay to discharge with oral antibiotics  -follow outpatient with primary physician and ENT     Core Measures  Code status: Full  DVT prophylaxis: Lovenox  GI prophylaxis: None  Antibiotics: Unasyn

## 2020-06-10 NOTE — DISCHARGE INSTRUCTIONS
Discharge Instructions    Discharged to home by taxi with self. Discharged via wheelchair, hospital escort: Yes.  Special equipment needed: Not Applicable    Be sure to schedule a follow-up appointment with your primary care doctor or any specialists as instructed.     Discharge Plan:   Smoking Cessation Offered: Patient Counseled    I understand that a diet low in cholesterol, fat, and sodium is recommended for good health. Unless I have been given specific instructions below for another diet, I accept this instruction as my diet prescription.   Other diet: Heart Healthy    Special Instructions:   None    Peritonsillar Abscess  Introduction  A peritonsillar abscess is a collection of yellowish-white fluid (pus) in the back of the throat. It forms behind the tonsils. Treatment usually involves draining the fluid. This may be done by:  · Putting a needle into the abscess.  · Cutting and draining the abscess.  Follow these instructions at home:  · Rest as much as you can.  · Take medicines only as told by your doctor.  · If you were given an antibiotic medicine, finish it all even if you start to feel better.  · If your abscess was drained by your doctor:  ¨ Mix 1 teaspoon of salt in 8 ounces of warm water for gargling.  ¨ Gargle 4 times per day or as needed for comfort.  ¨ Do not swallow this mixture.  · Drink a lot of fluids.  · Eat soft or liquid foods while your throat is sore. Frozen ice pops and ice cream are good choices.  · Keep all follow-up visits as told by your doctor. This is important.  Contact a doctor if:  · You have more pain, swelling, redness, or drainage in your throat.  · You have a headache, have low energy, or feel sick.  · You have a fever.  · You feel dizzy.  · You have trouble swallowing or eating.  · You have signs of body fluid loss (dehydration):  ¨ Light-headedness when you are standing.  ¨ Peeing (urinating) less.  ¨ A fast heart rate.  ¨ Dry mouth.  Get help right away if:  · You have  trouble talking or breathing.  · You find it easier to breathe when you lean forward.  · You are coughing up blood or throwing up (vomiting) blood.  · You have severe throat pain that is not helped by medicines.  · You start to drool.  This information is not intended to replace advice given to you by your health care provider. Make sure you discuss any questions you have with your health care provider.  Document Released: 12/06/2010 Document Revised: 05/25/2017 Document Reviewed: 08/03/2015  © 2017 Elsevier    · Is patient discharged on Warfarin / Coumadin?   No     Depression / Suicide Risk    As you are discharged from this [facility] facility, it is important to learn how to keep safe from harming yourself.    Recognize the warning signs:  · Abrupt changes in personality, positive or negative- including increase in energy   · Giving away possessions  · Change in eating patterns- significant weight changes-  positive or negative  · Change in sleeping patterns- unable to sleep or sleeping all the time   · Unwillingness or inability to communicate  · Depression  · Unusual sadness, discouragement and loneliness  · Talk of wanting to die  · Neglect of personal appearance   · Rebelliousness- reckless behavior  · Withdrawal from people/activities they love  · Confusion- inability to concentrate     If you or a loved one observes any of these behaviors or has concerns about self-harm, here's what you can do:  · Talk about it- your feelings and reasons for harming yourself  · Remove any means that you might use to hurt yourself (examples: pills, rope, extension cords, firearm)  · Get professional help from the community (Mental Health, Substance Abuse, psychological counseling)  · Do not be alone:Call your Safe Contact- someone whom you trust who will be there for you.  · Call your local CRISIS HOTLINE 265-7180 or 091-184-9724  · Call your local Children's Mobile Crisis Response Team [place] (733) 272-1987 or  [email or web address].NotaryAct  · Call the toll free National Suicide Prevention Hotlines   · National Suicide Prevention Lifeline 154-756-RZDW (8738)  · National Hope Line Network 800-SUICIDE (731-9585)

## 2020-06-10 NOTE — DISCHARGE SUMMARY
Discharge Summary    Date of Admission: 6/8/2020  Date of Discharge: No discharge date for patient encounter.  Discharging Attending: Chirag Ravi M.D.   Discharging Senior Resident: Dr. Rand   Discharging Intern: Dr. Vogt    CHIEF COMPLAINT ON ADMISSION  Sore throat    Reason for Admission  Streptococcal pharyngitis complicated with peritonsillar abscess    Admission Date  6/8/2020    CODE STATUS  Full Code    HPI & HOSPITAL COURSE  This is a 64 y.o. female here with sore throat and difficulty in swallowing which started 4 days prior to presentation.  She denied any fever, chills, cough, shortness of breath, nausea or vomiting, or any sick contacts.  She appeared ill on presentation and in pain especially when swallowing.  She tested positive for strep.  CT of the neck showed left tonsillitis with possible developing peritonsillar abscess, reactive adenopathy in the left anterior cervical lymph node, incidental finding of 12 mm thyroid cyst/nodule which will need outpatient follow-up.  There was no drooling, tripoding, stridor or airway compromise.  Patient was started on IV Unasyn and received a total of 7 doses.  Her symptoms have resolved, swelling of her neck and tonsillar area has significantly improved with decreased erythema.  She is able to swallow without any difficulty and is not requiring any oxygen at rest or when ambulating.  She is medically stable to be discharged with oral antibiotics and will follow up with PCP and ENT as outpatient     Therefore, she is discharged in good and stable condition to home with close outpatient follow-up.    The patient met 2-midnight criteria for an inpatient stay at the time of discharge.    Discharge Date  6/10/2020    FOLLOW UP ITEMS POST DISCHARGE  - Completion of antibiotics  - Follow-up with ENT for possible tonsillectomy  - Follow-up with ENT for incidental finding of the thyroid nodule/cyst  - Establish PCP    DISCHARGE DIAGNOSES  Active  Problems:    Hypertension POA: Unknown    Evolving left peritonsillar abscess determined by imaging POA: Unknown  Resolved Problems:    * No resolved hospital problems. *    FOLLOW UP  No future appointments.  Leny Corona M.D.  [address] [place] [id number]  [phone]    Go on 6/25/2020  11:30am follow up with Otorhinolaryngology    [facility]  [address] [place] [address]  [phone]    Schedule an appointment as soon as possible for a visit  Please call to establish with a Primary Care Physician and schedule your hospital follow up. Thank you!      MEDICATIONS ON DISCHARGE     Medication List      START taking these medications      Instructions   amoxicillin-clavulanate 875-125 MG Tabs  Commonly known as:  AUGMENTIN   Take 1 Tab by mouth 2 times a day for 5 days.  Dose:  1 Tab     ibuprofen 400 MG Tabs  Commonly known as:  IBU   Take 1 Tab by mouth every 6 hours as needed for Moderate Pain for up to 3 days.  Dose:  400 mg        CONTINUE taking these medications      Instructions   albuterol 108 (90 Base) MCG/ACT Aers inhalation aerosol   Doctor's comments:  May substitute for generic/name brand per insurance  Inhale 1-2 Puffs by mouth every four hours as needed (shortness of breath and/or wheezing).  Dose:  1-2 Puff     amLODIPine 10 MG Tabs  Commonly known as:  NORVASC   Take 10 mg by mouth every day.  Dose:  10 mg     aspirin 81 MG Chew chewable tablet  Commonly known as:  ASA   Take 81 mg by mouth every day.  Dose:  81 mg     lisinopril 5 MG Tabs  Commonly known as:  PRINIVIL   Take 2.5 mg by mouth every day.  Dose:  2.5 mg        STOP taking these medications    loratadine 10 MG Tabs  Commonly known as:  CLARITIN            Allergies  No Known Allergies    DIET  Orders Placed This Encounter   Procedures   • Diet Order Regular (Pt would like to request eggs and grits for breakfast.)     Standing Status:   Standing     Number of Occurrences:   1     Order Specific  Question:   Diet:     Answer:   Regular [1]     Comments:   Pt would like to request eggs and grits for breakfast.       ACTIVITY  As tolerated.  Weight bearing as tolerated    CONSULTATIONS  Dr. Corona with ENT consulted.  Treatment options were discussed and plan of care agreed upon.    PROCEDURES  None

## 2020-06-10 NOTE — PROGRESS NOTES
Assumed pt care at 0700. Received report from Hari SOMMERS. Pt A&O x4. Pt 8/10 pain at this time. Medicated per MAR. Respirations are even and unlabored on room air.   Updated on POC including discharge today, communication board updated. Bed locked and in lowest position. Call light and belongings within reach. Non-skid socks in place. Needs met, will continue to monitor.

## 2020-06-10 NOTE — CARE PLAN
Problem: Pain Management  Goal: Pain level will decrease to patient's comfort goal  Outcome: PROGRESSING AS EXPECTED     Problem: Knowledge Deficit  Goal: Knowledge of disease process/condition, treatment plan, diagnostic tests, and medications will improve  Outcome: PROGRESSING AS EXPECTED    Pt educated regarding plan of care and medications. All questions answered.

## 2020-06-10 NOTE — PROGRESS NOTES
Medication dosing error.  See MAR note. No adverse s/s noted. O2 sats 96%, no respiratory depression noted. MD notified. Wasted with Charge RN per dose given.

## 2020-06-10 NOTE — NON-PROVIDER
Daily Progress Note:     Date of Service: 6/10/2020  Primary Team: [facility]   Attending: Chirag Ravi M.D.   Senior Resident: Dr. Rand  Intern: Dr. Zoila Morris  Contact:  [phone]    Chief Complaint:   Sore throat pain    Subjective:   Lizzy is a 64 year old female admitted on 06/08/2020 for group A strep pharyngitis and left sided peritonsillar abscess.     No acute events overnight. Patient reports improvement to pain. No difficulty swallowing, difficulty breathing, or voice changes. She has not had a bowel movement in 2 days. No fevers, chills, nausea, vomiting.     Consultants/Specialty:  Dr. Mason, ENT    Review of Systems:    Review of Systems   Constitutional: Negative for chills and fever.   HENT: Positive for sore throat.    Eyes: Negative for blurred vision.   Respiratory: Negative for shortness of breath, wheezing and stridor.    Cardiovascular: Negative for chest pain and leg swelling.   Gastrointestinal: Positive for constipation. Negative for abdominal pain, nausea and vomiting.   Genitourinary: Negative for dysuria and urgency.   Neurological: Negative for sensory change and headaches.   Psychiatric/Behavioral: Negative.        Objective Data:   Physical Exam:   Vitals:   Temp:  [36.6 °C (97.9 °F)-37.5 °C (99.5 °F)] 36.7 °C (98.1 °F)  Pulse:  [70-94] 70  Resp:  [16-20] 18  BP: (121-159)/(63-84) 159/78  SpO2:  [90 %-97 %] 94 %     Physical Exam  Constitutional:       General: She is not in acute distress.  HENT:      Mouth/Throat:      Mouth: Mucous membranes are moist.      Pharynx: Posterior oropharyngeal erythema present. No oropharyngeal exudate.      Comments: Mild left tonsillar edema.   Eyes:      Conjunctiva/sclera: Conjunctivae normal.   Neck:      Musculoskeletal: Normal range of motion.      Comments: Mild TTP over left neck  Cardiovascular:      Rate and Rhythm: Normal rate and regular rhythm.      Heart sounds: No murmur. No gallop.    Pulmonary:      Effort:  Pulmonary effort is normal.      Breath sounds: Normal breath sounds.   Abdominal:      General: Abdomen is flat.      Palpations: Abdomen is soft.   Musculoskeletal: Normal range of motion.   Lymphadenopathy:      Cervical: No cervical adenopathy.   Skin:     General: Skin is warm and dry.   Neurological:      General: No focal deficit present.      Mental Status: She is alert and oriented to person, place, and time.   Psychiatric:         Mood and Affect: Mood normal.         Behavior: Behavior normal.           Labs:      Ref. Range 6/8/2020 11:23   Significant Indicator Unknown NEG   Site Unknown PERIPHERAL   Source Unknown BLD      Ref. Range 6/10/2020 05:58   WBC Latest Ref Range: 4.8 - 10.8 K/uL 12.1 (H)   RBC Latest Ref Range: 4.20 - 5.40 M/uL 5.30   Hemoglobin Latest Ref Range: 12.0 - 16.0 g/dL 14.7   Hematocrit Latest Ref Range: 37.0 - 47.0 % 48.0 (H)   MCV Latest Ref Range: 81.4 - 97.8 fL 90.6   MCH Latest Ref Range: 27.0 - 33.0 pg 27.7   MCHC Latest Ref Range: 33.6 - 35.0 g/dL 30.6 (L)   RDW Latest Ref Range: 35.9 - 50.0 fL 47.4   Platelet Count Latest Ref Range: 164 - 446 K/uL 267   MPV Latest Ref Range: 9.0 - 12.9 fL 10.7   Neutrophils-Polys Latest Ref Range: 44.00 - 72.00 % 54.20   Neutrophils (Absolute) Latest Ref Range: 2.00 - 7.15 K/uL 6.57   Lymphocytes Latest Ref Range: 22.00 - 41.00 % 31.70   Lymphs (Absolute) Latest Ref Range: 1.00 - 4.80 K/uL 3.84   Monocytes Latest Ref Range: 0.00 - 13.40 % 10.80   Monos (Absolute) Latest Ref Range: 0.00 - 0.85 K/uL 1.31 (H)   Eosinophils Latest Ref Range: 0.00 - 6.90 % 1.90   Eos (Absolute) Latest Ref Range: 0.00 - 0.51 K/uL 0.23   Basophils Latest Ref Range: 0.00 - 1.80 % 0.70   Baso (Absolute) Latest Ref Range: 0.00 - 0.12 K/uL 0.08   Immature Granulocytes Latest Ref Range: 0.00 - 0.90 % 0.70   Immature Granulocytes (abs) Latest Ref Range: 0.00 - 0.11 K/uL 0.08   Nucleated RBC Latest Units: /100 WBC 0.00   NRBC (Absolute) Latest Units: K/uL 0.00   Sodium  Latest Ref Range: 135 - 145 mmol/L 137   Potassium Latest Ref Range: 3.6 - 5.5 mmol/L 4.9   Chloride Latest Ref Range: 96 - 112 mmol/L 106   Co2 Latest Ref Range: 20 - 33 mmol/L 25   Anion Gap Latest Ref Range: 7.0 - 16.0  6.0 (L)   Glucose Latest Ref Range: 65 - 99 mg/dL 107 (H)   Bun Latest Ref Range: 8 - 22 mg/dL 10   Creatinine Latest Ref Range: 0.50 - 1.40 mg/dL 0.55   GFR If  Latest Ref Range: >60 mL/min/1.73 m 2 >60   GFR If Non  Latest Ref Range: >60 mL/min/1.73 m 2 >60   Calcium Latest Ref Range: 8.5 - 10.5 mg/dL 9.0       Imaging:   No New imaging    Assessment & Plan:  Sore throat:  64 year old female admitted with gradually progressive throat pain and swelling with 9mm left sided peritonsillar abscess and elevated WBC count 23.7 > 12.1. Group A Strep: Positive. Blood culture negative.     Transition to Augmentin 5 day course  Tylenol PRN for pain  Discharge home  Follow up with ENT outpatient     HTN:  Chronic hypertension on home medication.      Continue Lisinopril 2.5 Q Day and Amlodipine 10mg Q Day      Thyroid nodule:  Follow up with ENT outpatient
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
## 2020-06-13 LAB
BACTERIA BLD CULT: NORMAL
BACTERIA BLD CULT: NORMAL
SIGNIFICANT IND 70042: NORMAL
SIGNIFICANT IND 70042: NORMAL
SITE SITE: NORMAL
SITE SITE: NORMAL
SOURCE SOURCE: NORMAL
SOURCE SOURCE: NORMAL

## 2020-06-15 ENCOUNTER — PATIENT OUTREACH (OUTPATIENT)
Dept: HEALTH INFORMATION MANAGEMENT | Facility: OTHER | Age: 65
End: 2020-06-15

## 2020-06-15 SDOH — ECONOMIC STABILITY: TRANSPORTATION INSECURITY
IN THE PAST 12 MONTHS, HAS THE LACK OF TRANSPORTATION KEPT YOU FROM MEDICAL APPOINTMENTS OR FROM GETTING MEDICATIONS?: YES

## 2020-06-15 SDOH — ECONOMIC STABILITY: TRANSPORTATION INSECURITY
IN THE PAST 12 MONTHS, HAS LACK OF TRANSPORTATION KEPT YOU FROM MEETINGS, WORK, OR FROM GETTING THINGS NEEDED FOR DAILY LIVING?: YES

## 2020-06-15 SDOH — ECONOMIC STABILITY: FOOD INSECURITY: WITHIN THE PAST 12 MONTHS, YOU WORRIED THAT YOUR FOOD WOULD RUN OUT BEFORE YOU GOT MONEY TO BUY MORE.: NEVER TRUE

## 2020-06-15 SDOH — ECONOMIC STABILITY: FOOD INSECURITY: WITHIN THE PAST 12 MONTHS, THE FOOD YOU BOUGHT JUST DIDN'T LAST AND YOU DIDN'T HAVE MONEY TO GET MORE.: NEVER TRUE

## 2020-06-15 SDOH — ECONOMIC STABILITY: INCOME INSECURITY: HOW HARD IS IT FOR YOU TO PAY FOR THE VERY BASICS LIKE FOOD, HOUSING, MEDICAL CARE, AND HEATING?: NOT HARD AT ALL

## 2020-06-15 NOTE — PROGRESS NOTES
ONEYDA Vargas spoke to the patient to introduce CCM services. Completed SDOH screening and outpatient assessment. Patient stated she has a follow up appointment with PCP for June 23, 2020. Patient requested need for transportation services. I gave patient MTM information. Patient stated she moved here from [place] and has no family here as she lives alone but patient is affiliated with [employer] and she is currently on section 8 housing. Patient declined CCM services. Patient has food delivered from [employer]. Patient was very thankful for the call.     Community Health Worker Intake  • Social determinates of health intake completed  • Identified barriers to health. None   • Contact information provided to Lizzy Curtis. Yes  • Has PCP appointment scheduled for June 23 rd , 2020  • Scheduled Food Delivery/Home Visit/Outpatient Visit: Declined   • Referral to RN or SW: Declined   • Accepted/Declined Med's-To-Beds. N/A  • Inpatient/Outpatient assessment completed.Outpatient   • Did the patient receive medications post discharge: N/A    Plan:  Discharge patient from CCM services due to patient declining services

## 2021-03-03 DIAGNOSIS — Z23 NEED FOR VACCINATION: ICD-10-CM

## 2021-08-16 ENCOUNTER — APPOINTMENT (OUTPATIENT)
Dept: RADIOLOGY | Facility: MEDICAL CENTER | Age: 66
End: 2021-08-16
Attending: PHYSICIAN ASSISTANT
Payer: MEDICAID

## 2021-09-13 ENCOUNTER — APPOINTMENT (OUTPATIENT)
Dept: RADIOLOGY | Facility: MEDICAL CENTER | Age: 66
End: 2021-09-13
Attending: PHYSICIAN ASSISTANT
Payer: COMMERCIAL

## 2021-09-13 DIAGNOSIS — B18.2 CHRONIC HEPATITIS C WITHOUT HEPATIC COMA (HCC): ICD-10-CM

## 2021-09-13 DIAGNOSIS — R77.2 ELEVATED AFP: ICD-10-CM

## 2021-09-13 PROCEDURE — A9576 INJ PROHANCE MULTIPACK: HCPCS | Performed by: PHYSICIAN ASSISTANT

## 2021-09-13 PROCEDURE — 74183 MRI ABD W/O CNTR FLWD CNTR: CPT

## 2021-09-13 PROCEDURE — 700117 HCHG RX CONTRAST REV CODE 255: Performed by: PHYSICIAN ASSISTANT

## 2021-09-13 RX ADMIN — GADOTERIDOL 20 ML: 279.3 INJECTION, SOLUTION INTRAVENOUS at 09:28

## 2021-10-12 ENCOUNTER — HOSPITAL ENCOUNTER (OUTPATIENT)
Dept: RADIOLOGY | Facility: MEDICAL CENTER | Age: 66
End: 2021-10-12
Attending: PHYSICIAN ASSISTANT
Payer: COMMERCIAL

## 2021-10-12 DIAGNOSIS — Z12.31 VISIT FOR SCREENING MAMMOGRAM: ICD-10-CM

## 2021-10-12 PROCEDURE — 77063 BREAST TOMOSYNTHESIS BI: CPT

## 2021-10-18 ENCOUNTER — HOSPITAL ENCOUNTER (OUTPATIENT)
Dept: RADIOLOGY | Facility: MEDICAL CENTER | Age: 66
End: 2021-10-18
Payer: MEDICARE

## 2021-10-26 ENCOUNTER — HOSPITAL ENCOUNTER (OUTPATIENT)
Dept: RADIOLOGY | Facility: MEDICAL CENTER | Age: 66
End: 2021-10-26
Attending: PHYSICIAN ASSISTANT
Payer: COMMERCIAL

## 2021-10-26 DIAGNOSIS — R92.8 ABNORMAL MAMMOGRAM: ICD-10-CM

## 2021-10-26 PROCEDURE — 77065 DX MAMMO INCL CAD UNI: CPT | Mod: LT

## 2021-11-02 ENCOUNTER — HOSPITAL ENCOUNTER (OUTPATIENT)
Dept: RADIOLOGY | Facility: MEDICAL CENTER | Age: 66
End: 2021-11-02
Attending: PHYSICIAN ASSISTANT
Payer: MEDICARE

## 2021-11-02 DIAGNOSIS — R92.8 ABNORMAL FINDINGS ON DIAGNOSTIC IMAGING OF BREAST: ICD-10-CM

## 2021-12-06 ENCOUNTER — HOSPITAL ENCOUNTER (OUTPATIENT)
Dept: RADIOLOGY | Facility: MEDICAL CENTER | Age: 66
End: 2021-12-06
Attending: PHYSICIAN ASSISTANT
Payer: COMMERCIAL

## 2021-12-06 LAB — PATHOLOGY CONSULT NOTE: NORMAL

## 2021-12-06 PROCEDURE — 88305 TISSUE EXAM BY PATHOLOGIST: CPT

## 2021-12-06 PROCEDURE — 77065 DX MAMMO INCL CAD UNI: CPT | Mod: LT

## 2021-12-07 ENCOUNTER — TELEPHONE (OUTPATIENT)
Dept: RADIOLOGY | Facility: MEDICAL CENTER | Age: 66
End: 2021-12-07

## 2022-03-14 ENCOUNTER — APPOINTMENT (OUTPATIENT)
Dept: RADIOLOGY | Facility: MEDICAL CENTER | Age: 67
End: 2022-03-14
Attending: PHYSICIAN ASSISTANT
Payer: MEDICARE

## 2022-04-11 ENCOUNTER — HISTORICAL (OUTPATIENT)
Dept: ADMINISTRATIVE | Facility: HOSPITAL | Age: 67
End: 2022-04-11

## 2022-04-25 VITALS
SYSTOLIC BLOOD PRESSURE: 125 MMHG | HEIGHT: 63 IN | DIASTOLIC BLOOD PRESSURE: 70 MMHG | WEIGHT: 170 LBS | BODY MASS INDEX: 30.12 KG/M2

## 2024-05-10 ENCOUNTER — APPOINTMENT (RX ONLY)
Dept: URBAN - METROPOLITAN AREA CLINIC 4 | Facility: CLINIC | Age: 69
Setting detail: DERMATOLOGY
End: 2024-05-10

## 2024-05-10 DIAGNOSIS — L40.0 PSORIASIS VULGARIS: ICD-10-CM

## 2024-05-10 PROBLEM — L30.9 DERMATITIS, UNSPECIFIED: Status: ACTIVE | Noted: 2024-05-10

## 2024-05-10 PROCEDURE — ? BIOPSY BY SHAVE METHOD

## 2024-05-10 PROCEDURE — 11102 TANGNTL BX SKIN SINGLE LES: CPT

## 2024-05-10 PROCEDURE — ? COUNSELING

## 2024-05-10 ASSESSMENT — LOCATION DETAILED DESCRIPTION DERM
LOCATION DETAILED: LEFT PROXIMAL DORSAL FOREARM
LOCATION DETAILED: RIGHT ELBOW
LOCATION DETAILED: RIGHT PROXIMAL DORSAL FOREARM

## 2024-05-10 ASSESSMENT — LOCATION SIMPLE DESCRIPTION DERM
LOCATION SIMPLE: LEFT FOREARM
LOCATION SIMPLE: RIGHT FOREARM
LOCATION SIMPLE: RIGHT ELBOW

## 2024-05-10 ASSESSMENT — LOCATION ZONE DERM: LOCATION ZONE: ARM

## 2024-05-10 NOTE — PROCEDURE: BIOPSY BY SHAVE METHOD
Detail Level: Detailed
Depth Of Biopsy: dermis
Was A Bandage Applied: Yes
Size Of Lesion In Cm: 0.6
X Size Of Lesion In Cm: 0
Biopsy Type: H and E
Biopsy Method: Personna blade
Anesthesia Type: 1% lidocaine without epinephrine
Anesthesia Volume In Cc: 1
Hemostasis: Aluminum Chloride and Electrocautery
Wound Care: Vaseline
Dressing: bandage
Destruction After The Procedure: No
Type Of Destruction Used: Curettage
Curettage Text: The wound bed was treated with curettage after the biopsy was performed.
Cryotherapy Text: The wound bed was treated with cryotherapy after the biopsy was performed.
Electrodesiccation Text: The wound bed was treated with electrodesiccation after the biopsy was performed.
Electrodesiccation And Curettage Text: The wound bed was treated with electrodesiccation and curettage after the biopsy was performed.
Silver Nitrate Text: The wound bed was treated with silver nitrate after the biopsy was performed.
Lab: [id number]
Lab Facility: 
Consent: Written consent was obtained and risks were reviewed including but not limited to scarring, infection, bleeding, scabbing, incomplete removal, nerve damage and allergy to anesthesia.
Post-Care Instructions: I reviewed with the patient in detail post-care instructions. Patient is to keep the biopsy site dry overnight, and then apply bacitracin twice daily until healed. Patient may apply hydrogen peroxide soaks to remove any crusting.
Notification Instructions: Patient will be notified of biopsy results. However, patient instructed to call the office if not contacted within 2 weeks.
Billing Type: Third-Party Bill
Information: Selecting Yes will display possible errors in your note based on the variables you have selected. This validation is only offered as a suggestion for you. PLEASE NOTE THAT THE VALIDATION TEXT WILL BE REMOVED WHEN YOU FINALIZE YOUR NOTE. IF YOU WANT TO FAX A PRELIMINARY NOTE YOU WILL NEED TO TOGGLE THIS TO 'NO' IF YOU DO NOT WANT IT IN YOUR FAXED NOTE.

## 2024-05-16 ENCOUNTER — RX ONLY (OUTPATIENT)
Age: 69
Setting detail: RX ONLY
End: 2024-05-16

## 2024-05-16 RX ORDER — UREA 40 %
CREAM (GRAM) TOPICAL
Qty: 198 | Refills: 3 | Status: ERX | COMMUNITY
Start: 2024-05-16

## 2025-08-03 ENCOUNTER — PHARMACY VISIT (OUTPATIENT)
Dept: PHARMACY | Facility: MEDICAL CENTER | Age: 70
End: 2025-08-03
Payer: COMMERCIAL

## 2025-08-03 ENCOUNTER — HOSPITAL ENCOUNTER (EMERGENCY)
Facility: MEDICAL CENTER | Age: 70
End: 2025-08-03
Attending: EMERGENCY MEDICINE
Payer: COMMERCIAL

## 2025-08-03 ENCOUNTER — APPOINTMENT (OUTPATIENT)
Dept: RADIOLOGY | Facility: MEDICAL CENTER | Age: 70
End: 2025-08-03
Attending: EMERGENCY MEDICINE
Payer: COMMERCIAL

## 2025-08-03 VITALS
BODY MASS INDEX: 44.69 KG/M2 | SYSTOLIC BLOOD PRESSURE: 176 MMHG | HEIGHT: 63 IN | OXYGEN SATURATION: 91 % | TEMPERATURE: 97 F | RESPIRATION RATE: 17 BRPM | DIASTOLIC BLOOD PRESSURE: 85 MMHG | WEIGHT: 252.21 LBS | HEART RATE: 67 BPM

## 2025-08-03 DIAGNOSIS — J43.9 PULMONARY EMPHYSEMA, UNSPECIFIED EMPHYSEMA TYPE (HCC): ICD-10-CM

## 2025-08-03 DIAGNOSIS — F17.210 CIGARETTE SMOKER MOTIVATED TO QUIT: ICD-10-CM

## 2025-08-03 DIAGNOSIS — R07.9 RIGHT-SIDED CHEST PAIN: Primary | ICD-10-CM

## 2025-08-03 DIAGNOSIS — J44.1 ACUTE EXACERBATION OF CHRONIC OBSTRUCTIVE PULMONARY DISEASE (COPD) (HCC): ICD-10-CM

## 2025-08-03 LAB
ALBUMIN SERPL BCP-MCNC: 3.6 G/DL (ref 3.2–4.9)
ALBUMIN/GLOB SERPL: 0.9 G/DL
ALP SERPL-CCNC: 108 U/L (ref 30–99)
ALT SERPL-CCNC: 13 U/L (ref 2–50)
ANION GAP SERPL CALC-SCNC: 10 MMOL/L (ref 7–16)
AST SERPL-CCNC: 24 U/L (ref 12–45)
BASOPHILS # BLD AUTO: 0.5 % (ref 0–1.8)
BASOPHILS # BLD: 0.05 K/UL (ref 0–0.12)
BILIRUB SERPL-MCNC: 0.3 MG/DL (ref 0.1–1.5)
BUN SERPL-MCNC: 9 MG/DL (ref 8–22)
CALCIUM ALBUM COR SERPL-MCNC: 8.6 MG/DL (ref 8.5–10.5)
CALCIUM SERPL-MCNC: 8.3 MG/DL (ref 8.5–10.5)
CHLORIDE SERPL-SCNC: 105 MMOL/L (ref 96–112)
CO2 SERPL-SCNC: 20 MMOL/L (ref 20–33)
CREAT SERPL-MCNC: 0.49 MG/DL (ref 0.5–1.4)
EKG IMPRESSION: NORMAL
EOSINOPHIL # BLD AUTO: 0.18 K/UL (ref 0–0.51)
EOSINOPHIL NFR BLD: 1.9 % (ref 0–6.9)
ERYTHROCYTE [DISTWIDTH] IN BLOOD BY AUTOMATED COUNT: 52.4 FL (ref 35.9–50)
GFR SERPLBLD CREATININE-BSD FMLA CKD-EPI: 101 ML/MIN/1.73 M 2
GLOBULIN SER CALC-MCNC: 3.9 G/DL (ref 1.9–3.5)
GLUCOSE SERPL-MCNC: 103 MG/DL (ref 65–99)
HCT VFR BLD AUTO: 45.3 % (ref 37–47)
HGB BLD-MCNC: 14 G/DL (ref 12–16)
IMM GRANULOCYTES # BLD AUTO: 0.04 K/UL (ref 0–0.11)
IMM GRANULOCYTES NFR BLD AUTO: 0.4 % (ref 0–0.9)
LYMPHOCYTES # BLD AUTO: 2.86 K/UL (ref 1–4.8)
LYMPHOCYTES NFR BLD: 29.4 % (ref 22–41)
MCH RBC QN AUTO: 26.7 PG (ref 27–33)
MCHC RBC AUTO-ENTMCNC: 30.9 G/DL (ref 32.2–35.5)
MCV RBC AUTO: 86.3 FL (ref 81.4–97.8)
MONOCYTES # BLD AUTO: 0.88 K/UL (ref 0–0.85)
MONOCYTES NFR BLD AUTO: 9.1 % (ref 0–13.4)
NEUTROPHILS # BLD AUTO: 5.71 K/UL (ref 1.82–7.42)
NEUTROPHILS NFR BLD: 58.7 % (ref 44–72)
NRBC # BLD AUTO: 0 K/UL
NRBC BLD-RTO: 0 /100 WBC (ref 0–0.2)
NT-PROBNP SERPL IA-MCNC: 128 PG/ML (ref 0–125)
PLATELET # BLD AUTO: 191 K/UL (ref 164–446)
PMV BLD AUTO: 10.8 FL (ref 9–12.9)
POTASSIUM SERPL-SCNC: 3.7 MMOL/L (ref 3.6–5.5)
PROT SERPL-MCNC: 7.5 G/DL (ref 6–8.2)
RBC # BLD AUTO: 5.25 M/UL (ref 4.2–5.4)
SODIUM SERPL-SCNC: 135 MMOL/L (ref 135–145)
TROPONIN T SERPL-MCNC: <6 NG/L (ref 6–19)
WBC # BLD AUTO: 9.7 K/UL (ref 4.8–10.8)

## 2025-08-03 PROCEDURE — 93005 ELECTROCARDIOGRAM TRACING: CPT | Mod: TC | Performed by: EMERGENCY MEDICINE

## 2025-08-03 PROCEDURE — 99285 EMERGENCY DEPT VISIT HI MDM: CPT

## 2025-08-03 PROCEDURE — RXMED WILLOW AMBULATORY MEDICATION CHARGE: Performed by: EMERGENCY MEDICINE

## 2025-08-03 PROCEDURE — 700101 HCHG RX REV CODE 250: Mod: UD | Performed by: EMERGENCY MEDICINE

## 2025-08-03 PROCEDURE — 36415 COLL VENOUS BLD VENIPUNCTURE: CPT

## 2025-08-03 PROCEDURE — 83880 ASSAY OF NATRIURETIC PEPTIDE: CPT

## 2025-08-03 PROCEDURE — 80053 COMPREHEN METABOLIC PANEL: CPT

## 2025-08-03 PROCEDURE — 84484 ASSAY OF TROPONIN QUANT: CPT

## 2025-08-03 PROCEDURE — 71275 CT ANGIOGRAPHY CHEST: CPT

## 2025-08-03 PROCEDURE — 74177 CT ABD & PELVIS W/CONTRAST: CPT

## 2025-08-03 PROCEDURE — 94640 AIRWAY INHALATION TREATMENT: CPT

## 2025-08-03 PROCEDURE — 85025 COMPLETE CBC W/AUTO DIFF WBC: CPT

## 2025-08-03 PROCEDURE — 700117 HCHG RX CONTRAST REV CODE 255: Mod: UD | Performed by: EMERGENCY MEDICINE

## 2025-08-03 PROCEDURE — 71045 X-RAY EXAM CHEST 1 VIEW: CPT

## 2025-08-03 RX ORDER — HYDROCODONE BITARTRATE AND ACETAMINOPHEN 5; 325 MG/1; MG/1
1 TABLET ORAL EVERY 4 HOURS PRN
Qty: 12 TABLET | Refills: 0 | Status: SHIPPED | OUTPATIENT
Start: 2025-08-03 | End: 2025-08-06

## 2025-08-03 RX ORDER — IPRATROPIUM BROMIDE AND ALBUTEROL SULFATE 2.5; .5 MG/3ML; MG/3ML
3 SOLUTION RESPIRATORY (INHALATION) ONCE
Status: COMPLETED | OUTPATIENT
Start: 2025-08-03 | End: 2025-08-03

## 2025-08-03 RX ORDER — ALBUTEROL SULFATE 90 UG/1
2 INHALANT RESPIRATORY (INHALATION) EVERY 4 HOURS PRN
Qty: 18 G | Refills: 2 | Status: SHIPPED | OUTPATIENT
Start: 2025-08-03

## 2025-08-03 RX ADMIN — IPRATROPIUM BROMIDE AND ALBUTEROL SULFATE 3 ML: .5; 2.5 SOLUTION RESPIRATORY (INHALATION) at 14:45

## 2025-08-03 RX ADMIN — IOHEXOL 95 ML: 350 INJECTION, SOLUTION INTRAVENOUS at 19:00
